# Patient Record
Sex: MALE | Race: WHITE | NOT HISPANIC OR LATINO | Employment: OTHER | URBAN - NONMETROPOLITAN AREA
[De-identification: names, ages, dates, MRNs, and addresses within clinical notes are randomized per-mention and may not be internally consistent; named-entity substitution may affect disease eponyms.]

---

## 2017-10-31 ENCOUNTER — OFFICE VISIT (OUTPATIENT)
Dept: UROLOGY | Facility: CLINIC | Age: 82
End: 2017-10-31

## 2017-10-31 VITALS — BODY MASS INDEX: 24.91 KG/M2 | HEIGHT: 70 IN | WEIGHT: 174 LBS

## 2017-10-31 DIAGNOSIS — R97.20 ELEVATED PSA: ICD-10-CM

## 2017-10-31 DIAGNOSIS — N40.1 BENIGN PROSTATIC HYPERPLASIA WITH WEAK URINARY STREAM: Primary | ICD-10-CM

## 2017-10-31 DIAGNOSIS — R39.12 BENIGN PROSTATIC HYPERPLASIA WITH WEAK URINARY STREAM: Primary | ICD-10-CM

## 2017-10-31 PROCEDURE — 99203 OFFICE O/P NEW LOW 30 MIN: CPT | Performed by: UROLOGY

## 2017-10-31 RX ORDER — FINASTERIDE 5 MG/1
5 TABLET, FILM COATED ORAL DAILY
Qty: 30 TABLET | Refills: 11 | Status: SHIPPED | OUTPATIENT
Start: 2017-10-31

## 2017-10-31 NOTE — PROGRESS NOTES
Chief Complaint:          Chief Complaint   Patient presents with   • Elevated PSA       HPI:   89 y.o. male.    HPI  Pt had lymphoma 26 years ago and he had chemotherapy.  Pt referred for a psa of 4.7.  Pt denies difficulty voiding.  Pt voids nocturia x 2    Past Medical History:        Past Medical History:   Diagnosis Date   • Asthma    • Cancer          Current Meds:     Current Outpatient Prescriptions   Medication Sig Dispense Refill   • PROAIR  (90 Base) MCG/ACT inhaler INHALE 2 PUFFS EVERY 4 HOURS AS NEEDED  5     No current facility-administered medications for this visit.         Allergies:      No Known Allergies     Past Surgical History:     Past Surgical History:   Procedure Laterality Date   • MASTOID SURGERY     • SHOULDER SURGERY           Social History:     Social History     Social History   • Marital status:      Spouse name: N/A   • Number of children: N/A   • Years of education: N/A     Occupational History   • Not on file.     Social History Main Topics   • Smoking status: Never Smoker   • Smokeless tobacco: Never Used   • Alcohol use No   • Drug use: No   • Sexual activity: Not on file     Other Topics Concern   • Not on file     Social History Narrative   • No narrative on file       Family History:     Family History   Problem Relation Age of Onset   • Prostate cancer Brother        Review of Systems:     Review of Systems    Physical Exam:     Physical Exam   Constitutional: He is oriented to person, place, and time.   HENT:   Head: Normocephalic and atraumatic.   Right Ear: External ear normal.   Left Ear: External ear normal.   Nose: Nose normal.   Mouth/Throat: Oropharynx is clear and moist.   Eyes: Conjunctivae and EOM are normal. Pupils are equal, round, and reactive to light.   Neck: Normal range of motion. Neck supple. No thyromegaly present.   Cardiovascular: Normal rate, regular rhythm, normal heart sounds and intact distal pulses.    No murmur  heard.  Pulmonary/Chest: Effort normal and breath sounds normal. No respiratory distress. He has no wheezes. He has no rales. He exhibits no tenderness.   Abdominal: Soft. Bowel sounds are normal. He exhibits no distension and no mass. There is no tenderness. No hernia.   Genitourinary: Rectum normal, prostate normal and penis normal.   Genitourinary Comments: Moderately enlarged without nodularity   Musculoskeletal: Normal range of motion. He exhibits no edema or tenderness.   Lymphadenopathy:     He has no cervical adenopathy.   Neurological: He is alert and oriented to person, place, and time. No cranial nerve deficit. He exhibits normal muscle tone. Coordination normal.   Skin: Skin is warm. No rash noted.   Psychiatric: He has a normal mood and affect. His behavior is normal. Judgment and thought content normal.   Nursing note and vitals reviewed.      Procedure:     No notes on file      Assessment:     Encounter Diagnoses   Name Primary?   • Benign prostatic hyperplasia with weak urinary stream Yes   • Elevated PSA      No orders of the defined types were placed in this encounter.      Plan:   I would start pt on finasteride and repeat his psa in 6 months    Counseling was given to patient and family for the following topics impressions. and the interim medical history and current results were reviewed.  A treatment plan with follow-up was made. Total time of the encounter was 35 minutes and 35 minutes were spent discussing Benign prostatic hyperplasia with weak urinary stream [N40.1, R39.12] face-to-face.        This document has been electronically signed by Jamin Marquis MD October 31, 2017 3:01 PM

## 2018-04-05 ENCOUNTER — OFFICE VISIT (OUTPATIENT)
Dept: SURGERY | Facility: CLINIC | Age: 83
End: 2018-04-05

## 2018-04-05 VITALS
HEART RATE: 75 BPM | HEIGHT: 65 IN | TEMPERATURE: 98.3 F | WEIGHT: 170 LBS | BODY MASS INDEX: 28.32 KG/M2 | DIASTOLIC BLOOD PRESSURE: 79 MMHG | RESPIRATION RATE: 17 BRPM | SYSTOLIC BLOOD PRESSURE: 154 MMHG

## 2018-04-05 DIAGNOSIS — L98.9 DISORDER OF SKIN OR SUBCUTANEOUS TISSUE: Primary | ICD-10-CM

## 2018-04-05 PROCEDURE — 99203 OFFICE O/P NEW LOW 30 MIN: CPT | Performed by: SURGERY

## 2018-04-05 RX ORDER — ASCORBIC ACID 500 MG
500 TABLET ORAL DAILY
COMMUNITY

## 2018-04-05 RX ORDER — UBIDECARENONE 75 MG
50 CAPSULE ORAL DAILY
COMMUNITY

## 2018-04-05 NOTE — PROGRESS NOTES
4/5/2018    Patient Information  Hal Zaragoza  [address] [place] [address]  3/18/1921  [phone] (home)     Chief Complaint   Patient presents with   • Skin Lesion     Skin Lesion Forehead/Suture removal L cheek       HPI  Patient's a 89-year-old white male referred by Dr. Pablo.  He has a lesion on his anterior left scalp which is ulcerating.  He also complains of a lesion on his right ear and a lesion posterior to the right ear.  He has a history of skin cancers.  These lesions are getting bigger and is quite concerned about them.  There is no radiation of pain.  There are no other modifying factors or associated symptoms.      Review of Systems:  The Past medical history, family history, social history, medication list, allergies, and Review of Systems has been reviewed and confirmed.    General: negative  Integumentary: negative  Eyes: glasses, eyes itch  ENT: hearing loss  Respiratory: shortness of breath, chronic cough and wheezing  Gastrointestinal: loss of appetite, heartburn and constipation  Cardiovascular: slow heart rate  Neurological: dizziness  Psychiatric: anxiety, depression, insomnia and mood swings  Hematologic/Lymphatic: easy bleeding and easy bruising  Genitourinary: incontinence  Musculoskeletal: painful joints, joint swelling and joint stiffness  Endocrine: negative  Breasts: negative      Patient Active Problem List   Diagnosis   • Cancer         Past Medical History:   Diagnosis Date   • Asthma    • Cancer    • Hiatal hernia    • Non Hodgkin's lymphoma    • Stroke          Past Surgical History:   Procedure Laterality Date   • EXTERNAL EAR SURGERY     • MASTOID SURGERY     • SHOULDER SURGERY     • SKIN CANCER EXCISION           Family History   Problem Relation Age of Onset   • Prostate cancer Brother    • Hypertension Brother    • Heart disease Brother    • Cancer Brother    • Cancer Mother    • Diabetes Mother    • Heart disease Sister    • Cancer Sister          Social History  "  Substance Use Topics   • Smoking status: Never Smoker   • Smokeless tobacco: Never Used   • Alcohol use No       Current Outpatient Prescriptions   Medication Sig Dispense Refill   • ascorbic acid (VITAMIN C) 500 MG tablet Take 500 mg by mouth Daily.     • vitamin B-12 (CYANOCOBALAMIN) 100 MCG tablet Take 50 mcg by mouth Daily.     • finasteride (PROSCAR) 5 MG tablet Take 1 tablet by mouth Daily. 30 tablet 11   • PROAIR  (90 Base) MCG/ACT inhaler INHALE 2 PUFFS EVERY 4 HOURS AS NEEDED  5     No current facility-administered medications for this visit.          Allergies  Review of patient's allergies indicates no known allergies.    /79   Pulse 75   Temp 98.3 °F (36.8 °C)   Resp 17   Ht 165.1 cm (65\")   Wt 77.1 kg (170 lb)   BMI 28.29 kg/m²      Physical Exam   Constitutional: He is oriented to person, place, and time. He appears well-developed and well-nourished. No distress.   HENT:   Head: Normocephalic.   Right Ear: External ear normal.   Left Ear: External ear normal.   Nose: Nose normal.   Mouth/Throat: Oropharynx is clear and moist.   Eyes: Conjunctivae and EOM are normal. Right eye exhibits no discharge. Left eye exhibits no discharge.   Neck: Normal range of motion. No JVD present. No tracheal deviation present. No thyromegaly present.   Cardiovascular: Normal rate, regular rhythm, normal heart sounds and intact distal pulses.  Exam reveals no gallop and no friction rub.    No murmur heard.  Pulmonary/Chest: Effort normal and breath sounds normal. No stridor. No respiratory distress. He has no wheezes. He has no rales. He exhibits no tenderness.   Abdominal: Soft. Bowel sounds are normal. He exhibits no distension and no mass. There is no tenderness. There is no rebound and no guarding. No hernia.   Genitourinary: Rectal exam shows guaiac negative stool.   Musculoskeletal: Normal range of motion. He exhibits no edema, tenderness or deformity.   Lymphadenopathy:     He has no cervical " adenopathy.   Neurological: He is alert and oriented to person, place, and time. He has normal reflexes. He displays normal reflexes. No cranial nerve deficit. He exhibits normal muscle tone. Coordination normal.   Skin: Skin is warm and dry. No rash noted. He is not diaphoretic. No erythema. No pallor.   Left anterior scalp there is a 2.5 cm all started lesion.  Right ear there is a 1 cm raised lesion.  Just posterior to the right ear there is a 3 cm area of crusty skin.  Patient also has sutures just left to his nose from a previous excision of a skin lesion.  The sutures have been removed   Psychiatric: He has a normal mood and affect. His behavior is normal. Judgment and thought content normal.                 ASSESSMENT  Multiple skin lesions suspicious for skin cancer        PLAN    Excision of scalp lesion, right ear lesion, and posterior right ear lesion in the operating room.  We'll also need to get records from his dermatologist.      informational weight control and smoking cessation given to patient    This document signed by Johnny Schulz MD April 5, 2018 12:23 PM

## 2018-04-10 ENCOUNTER — TELEPHONE (OUTPATIENT)
Dept: SURGERY | Facility: CLINIC | Age: 83
End: 2018-04-10

## 2018-04-10 NOTE — TELEPHONE ENCOUNTER
I called the patient to confirm the PAT. The patient's wife said they are aware that it is the 16th at 9:45

## 2018-04-16 ENCOUNTER — APPOINTMENT (OUTPATIENT)
Dept: PREADMISSION TESTING | Facility: HOSPITAL | Age: 83
End: 2018-04-16

## 2018-04-17 ENCOUNTER — APPOINTMENT (OUTPATIENT)
Dept: PREADMISSION TESTING | Facility: HOSPITAL | Age: 83
End: 2018-04-17

## 2018-04-17 LAB
ANION GAP SERPL CALCULATED.3IONS-SCNC: 6.8 MMOL/L (ref 3.6–11.2)
BUN BLD-MCNC: 20 MG/DL (ref 7–21)
BUN/CREAT SERPL: 16.9 (ref 7–25)
CALCIUM SPEC-SCNC: 9.5 MG/DL (ref 7.7–10)
CHLORIDE SERPL-SCNC: 105 MMOL/L (ref 99–112)
CO2 SERPL-SCNC: 28.2 MMOL/L (ref 24.3–31.9)
CREAT BLD-MCNC: 1.18 MG/DL (ref 0.43–1.29)
DEPRECATED RDW RBC AUTO: 43 FL (ref 37–54)
ERYTHROCYTE [DISTWIDTH] IN BLOOD BY AUTOMATED COUNT: 12.6 % (ref 11.5–14.5)
GFR SERPL CREATININE-BSD FRML MDRD: 57 ML/MIN/1.73
GLUCOSE BLD-MCNC: 90 MG/DL (ref 70–110)
HCT VFR BLD AUTO: 41.2 % (ref 42–52)
HGB BLD-MCNC: 13.7 G/DL (ref 14–18)
MCH RBC QN AUTO: 32.2 PG (ref 27–33)
MCHC RBC AUTO-ENTMCNC: 33.3 G/DL (ref 33–37)
MCV RBC AUTO: 96.9 FL (ref 80–94)
OSMOLALITY SERPL CALC.SUM OF ELEC: 281.5 MOSM/KG (ref 273–305)
PLATELET # BLD AUTO: 215 10*3/MM3 (ref 130–400)
PMV BLD AUTO: 9.7 FL (ref 6–10)
POTASSIUM BLD-SCNC: 4 MMOL/L (ref 3.5–5.3)
RBC # BLD AUTO: 4.25 10*6/MM3 (ref 4.7–6.1)
SODIUM BLD-SCNC: 140 MMOL/L (ref 135–153)
WBC NRBC COR # BLD: 7.74 10*3/MM3 (ref 4.5–12.5)

## 2018-04-17 PROCEDURE — 36415 COLL VENOUS BLD VENIPUNCTURE: CPT

## 2018-04-17 PROCEDURE — 80048 BASIC METABOLIC PNL TOTAL CA: CPT | Performed by: SURGERY

## 2018-04-17 PROCEDURE — 93010 ELECTROCARDIOGRAM REPORT: CPT | Performed by: INTERNAL MEDICINE

## 2018-04-17 PROCEDURE — 93005 ELECTROCARDIOGRAM TRACING: CPT

## 2018-04-17 PROCEDURE — 85027 COMPLETE CBC AUTOMATED: CPT | Performed by: SURGERY

## 2018-04-17 RX ORDER — DOCUSATE SODIUM 100 MG/1
100 CAPSULE, LIQUID FILLED ORAL DAILY
COMMUNITY

## 2018-04-17 NOTE — DISCHARGE INSTRUCTIONS
TAKE the following medications the morning of surgery:  All heart or blood pressure medications    Please discontinue all blood thinners and anticoagulants (except aspirin) prior to surgery as per your surgeon and cardiologist instructions.  Aspirin may be continued up to the day prior to surgery.    HOLD all diabetic medications the morning of surgery as order by physician.        Arrival time for surgery on 4/18/2018 is 0830 am.    General Instructions:  • Do NOT eat or drink after midnight 4/17/2018 which includes water, mints, or gum.  • You may brush your teeth. Dental appliances that are removable must be taken out day of surgery.  • Do NOT smoke, chew tobacco, or drink alcohol within 24 hours prior to surgery.  • Bring medications in original bottles, any inhalers and if applicable your C-PAP/BI-PAP machine  • Bring any papers given to you in the doctor’s office  • Wear clean, comfortable clothes and socks  • Do NOT wear contact lenses or make-up or dark nail polish.  Bring a case for your glasses if applicable.  • Bring crutches or walker if applicable  • Leave all other valuables and jewelry at home  • If you were given a blood bank armband, continue to wear it until discharged.    Preventing a Surgical Site Infection:  • Shower the night before surgery (unless instructed otherwise) using a fresh bar of anti-bacterial soap (such as Dial) and clean washcloth.  Dry with a clean towel and dress in clean clothing.  • For 2 to 3 days before surgery, avoid shaving with a razor near where you will have surgery because the razor can irritate skin and make it easier to develop an infection.  Ask your surgeon if you will be receiving antibiotics prior to surgery.  • Make sure you, your family, and all healthcare providers clean their hands with soap and water or an alcohol-based hand  before caring for you or your wound.  • If at all possible, quit smoking as many days before surgery as you can.    Day of  Surgery:  Upon arrival, a pre-op nurse and anesthesiologist will review your health history, obtain vital signs, and answer questions you may have.  The only belongings needed at this time will be your home medications and if applicable you C-PAP/BI-PAP machine.  If you are staying overnight, your family can leave the rest of your belongings in the car and bring them to your room later.  A pre-op nurse will start an IV and you may receive medication in preparation for surgery.  Due to patient privacy and limited space, only one member of your family will be able to accompany you in the pre-op area.  While you are in surgery your family should notify the waiting room  if they leave the waiting room area and provide a contact number.  Please be aware that surgery does come with discomfort.  We want to make every effort to control your discomfort so please discuss any uncontrolled symptoms with your nurse.  Your doctor will most likely have prescribed pain medications.  If you are going home after surgery you will receive individualized written care instructions before being discharged.  A responsible adult must drive you to and from the hospital on the day of surgery and stay with you for 24 hours.  If you are staying overnight following surgery, you will be transported to your hospital room following the recovery period.

## 2018-04-18 ENCOUNTER — ANESTHESIA (OUTPATIENT)
Dept: PERIOP | Facility: HOSPITAL | Age: 83
End: 2018-04-18

## 2018-04-18 ENCOUNTER — ANESTHESIA EVENT (OUTPATIENT)
Dept: PERIOP | Facility: HOSPITAL | Age: 83
End: 2018-04-18

## 2018-04-18 ENCOUNTER — HOSPITAL ENCOUNTER (OUTPATIENT)
Facility: HOSPITAL | Age: 83
Setting detail: HOSPITAL OUTPATIENT SURGERY
Discharge: HOME | End: 2018-04-18
Attending: SURGERY | Admitting: SURGERY

## 2018-04-18 VITALS
RESPIRATION RATE: 14 BRPM | DIASTOLIC BLOOD PRESSURE: 86 MMHG | BODY MASS INDEX: 28.32 KG/M2 | WEIGHT: 170 LBS | OXYGEN SATURATION: 98 % | TEMPERATURE: 98.6 F | SYSTOLIC BLOOD PRESSURE: 164 MMHG | HEART RATE: 80 BPM | HEIGHT: 65 IN

## 2018-04-18 DIAGNOSIS — L98.9 DISORDER OF SKIN OR SUBCUTANEOUS TISSUE: ICD-10-CM

## 2018-04-18 PROCEDURE — 25010000002 FENTANYL CITRATE (PF) 100 MCG/2ML SOLUTION: Performed by: NURSE ANESTHETIST, CERTIFIED REGISTERED

## 2018-04-18 PROCEDURE — 25010000002 PHENYLEPHRINE PER 1 ML: Performed by: NURSE ANESTHETIST, CERTIFIED REGISTERED

## 2018-04-18 PROCEDURE — 11422 EXC H-F-NK-SP B9+MARG 1.1-2: CPT | Performed by: SURGERY

## 2018-04-18 PROCEDURE — 25010000002 PROPOFOL 10 MG/ML EMULSION: Performed by: NURSE ANESTHETIST, CERTIFIED REGISTERED

## 2018-04-18 PROCEDURE — 88305 TISSUE EXAM BY PATHOLOGIST: CPT | Performed by: SURGERY

## 2018-04-18 PROCEDURE — 11642 EXC F/E/E/N/L MAL+MRG 1.1-2: CPT | Performed by: SURGERY

## 2018-04-18 PROCEDURE — 11622 EXC S/N/H/F/G MAL+MRG 1.1-2: CPT | Performed by: SURGERY

## 2018-04-18 PROCEDURE — 11641 EXC F/E/E/N/L MAL+MRG 0.6-1: CPT | Performed by: SURGERY

## 2018-04-18 RX ORDER — BUPIVACAINE HYDROCHLORIDE AND EPINEPHRINE 5; 5 MG/ML; UG/ML
INJECTION, SOLUTION PERINEURAL AS NEEDED
Status: DISCONTINUED | OUTPATIENT
Start: 2018-04-18 | End: 2018-04-18 | Stop reason: HOSPADM

## 2018-04-18 RX ORDER — FENTANYL CITRATE 50 UG/ML
50 INJECTION, SOLUTION INTRAMUSCULAR; INTRAVENOUS
Status: DISCONTINUED | OUTPATIENT
Start: 2018-04-18 | End: 2018-04-18 | Stop reason: HOSPADM

## 2018-04-18 RX ORDER — FENTANYL CITRATE 50 UG/ML
INJECTION, SOLUTION INTRAMUSCULAR; INTRAVENOUS AS NEEDED
Status: DISCONTINUED | OUTPATIENT
Start: 2018-04-18 | End: 2018-04-18 | Stop reason: SURG

## 2018-04-18 RX ORDER — BUPIVACAINE HYDROCHLORIDE 5 MG/ML
INJECTION, SOLUTION EPIDURAL; INTRACAUDAL AS NEEDED
Status: DISCONTINUED | OUTPATIENT
Start: 2018-04-18 | End: 2018-04-18 | Stop reason: HOSPADM

## 2018-04-18 RX ORDER — OXYCODONE HYDROCHLORIDE AND ACETAMINOPHEN 5; 325 MG/1; MG/1
1 TABLET ORAL ONCE AS NEEDED
Status: DISCONTINUED | OUTPATIENT
Start: 2018-04-18 | End: 2018-04-18 | Stop reason: HOSPADM

## 2018-04-18 RX ORDER — MIDAZOLAM HYDROCHLORIDE 1 MG/ML
1 INJECTION INTRAMUSCULAR; INTRAVENOUS
Status: DISCONTINUED | OUTPATIENT
Start: 2018-04-18 | End: 2018-04-18 | Stop reason: HOSPADM

## 2018-04-18 RX ORDER — MEPERIDINE HYDROCHLORIDE 50 MG/ML
12.5 INJECTION INTRAMUSCULAR; INTRAVENOUS; SUBCUTANEOUS
Status: DISCONTINUED | OUTPATIENT
Start: 2018-04-18 | End: 2018-04-18 | Stop reason: HOSPADM

## 2018-04-18 RX ORDER — SODIUM CHLORIDE, SODIUM LACTATE, POTASSIUM CHLORIDE, CALCIUM CHLORIDE 600; 310; 30; 20 MG/100ML; MG/100ML; MG/100ML; MG/100ML
125 INJECTION, SOLUTION INTRAVENOUS CONTINUOUS
Status: DISCONTINUED | OUTPATIENT
Start: 2018-04-18 | End: 2018-04-18 | Stop reason: HOSPADM

## 2018-04-18 RX ORDER — ONDANSETRON 2 MG/ML
4 INJECTION INTRAMUSCULAR; INTRAVENOUS ONCE AS NEEDED
Status: DISCONTINUED | OUTPATIENT
Start: 2018-04-18 | End: 2018-04-18 | Stop reason: HOSPADM

## 2018-04-18 RX ORDER — LIDOCAINE HYDROCHLORIDE 20 MG/ML
INJECTION, SOLUTION INFILTRATION; PERINEURAL AS NEEDED
Status: DISCONTINUED | OUTPATIENT
Start: 2018-04-18 | End: 2018-04-18 | Stop reason: SURG

## 2018-04-18 RX ORDER — IPRATROPIUM BROMIDE AND ALBUTEROL SULFATE 2.5; .5 MG/3ML; MG/3ML
3 SOLUTION RESPIRATORY (INHALATION) ONCE AS NEEDED
Status: DISCONTINUED | OUTPATIENT
Start: 2018-04-18 | End: 2018-04-18 | Stop reason: HOSPADM

## 2018-04-18 RX ORDER — MAGNESIUM HYDROXIDE 1200 MG/15ML
LIQUID ORAL AS NEEDED
Status: DISCONTINUED | OUTPATIENT
Start: 2018-04-18 | End: 2018-04-18 | Stop reason: HOSPADM

## 2018-04-18 RX ORDER — BACITRACIN, NEOMYCIN, POLYMYXIN B 400; 3.5; 5 [USP'U]/G; MG/G; [USP'U]/G
OINTMENT TOPICAL AS NEEDED
Status: DISCONTINUED | OUTPATIENT
Start: 2018-04-18 | End: 2018-04-18 | Stop reason: HOSPADM

## 2018-04-18 RX ORDER — MIDAZOLAM HYDROCHLORIDE 1 MG/ML
2 INJECTION INTRAMUSCULAR; INTRAVENOUS
Status: DISCONTINUED | OUTPATIENT
Start: 2018-04-18 | End: 2018-04-18 | Stop reason: HOSPADM

## 2018-04-18 RX ORDER — PROPOFOL 10 MG/ML
VIAL (ML) INTRAVENOUS AS NEEDED
Status: DISCONTINUED | OUTPATIENT
Start: 2018-04-18 | End: 2018-04-18 | Stop reason: SURG

## 2018-04-18 RX ORDER — SODIUM CHLORIDE 0.9 % (FLUSH) 0.9 %
1-10 SYRINGE (ML) INJECTION AS NEEDED
Status: DISCONTINUED | OUTPATIENT
Start: 2018-04-18 | End: 2018-04-18 | Stop reason: HOSPADM

## 2018-04-18 RX ADMIN — PROPOFOL 100 MG: 10 INJECTION, EMULSION INTRAVENOUS at 09:38

## 2018-04-18 RX ADMIN — LIDOCAINE HYDROCHLORIDE 60 MG: 20 INJECTION, SOLUTION INFILTRATION; PERINEURAL at 09:38

## 2018-04-18 RX ADMIN — SODIUM CHLORIDE, POTASSIUM CHLORIDE, SODIUM LACTATE AND CALCIUM CHLORIDE 125 ML/HR: 600; 310; 30; 20 INJECTION, SOLUTION INTRAVENOUS at 09:27

## 2018-04-18 RX ADMIN — FENTANYL CITRATE 100 MCG: 50 INJECTION INTRAMUSCULAR; INTRAVENOUS at 09:33

## 2018-04-18 RX ADMIN — PHENYLEPHRINE HYDROCHLORIDE 100 MCG: 10 INJECTION, SOLUTION INTRAMUSCULAR; INTRAVENOUS; SUBCUTANEOUS at 10:00

## 2018-04-18 RX ADMIN — PHENYLEPHRINE HYDROCHLORIDE 100 MCG: 10 INJECTION, SOLUTION INTRAMUSCULAR; INTRAVENOUS; SUBCUTANEOUS at 09:48

## 2018-04-18 NOTE — OP NOTE
Hal Zaragoza  4/18/2018      Operative Progress Note:    Surgeon and Assistant: Dr. Schulz    Pre-Operative Diagnosis: multiple skin lesions    Post-Operative Diagnosis: multiple skin lesions    Procedure(s):  excision left forehead lesion, excision right forehead lesion, excision posterior scalp lesion, excision left ear lesion, excision left posterior ear lesion.    Type of Anesthesia Administered: LMA and infection Marcaine 0.5% with epinephrine    Estimated Blood Loss: Minimal    Blood Products: None    Specimen Obtained/Removed: 1 cm left forehead lesion, 1 cm right forehead lesion, 1 cm posterior scalp lesion, 1 cm left ear lesion, 1 cm posterior left ear lesion.    Complication(s):  None    Graft/Implant/Prosthetics/Implanted Device/Transplants: None    Indication: patient's 89-year-old white male    Findings: as described on the specimens    Operative Report:  Patient was taken operating room and placed in the spine positional operating table.  LMA anesthesia was induced.  All lesions as described above under procedure and specimens were prepped and draped with Betadine.  All lesions were injected with Marcaine 0.5% with epinephrine.  All lesions were elliptically excised and closed with interrupted 4-0 nylon.  Sterile dressings were applied.  Procedure terminated.  Patient thought procedure very well returned recovery room satisfactory condition.    Patient was discharged home at recovery and we seen back in the office in 2 weeks.      Electronically Signed by: Johnny Schulz MD        Dictated Utilizing Dragon Dictation

## 2018-04-18 NOTE — ANESTHESIA POSTPROCEDURE EVALUATION
Patient: Hal Zaragoza    Procedure Summary     Date:  04/18/18 Room / Location:  [facility] OR 01 /  [facility] OR    Anesthesia Start:  0933 Anesthesia Stop:  1033    Procedure:  SKIN LESION EXCISION SCALP, RIGHT EAR, RIGHT NECK (Right ) Diagnosis:       Disorder of skin or subcutaneous tissue      (Disorder of skin or subcutaneous tissue [L98.9])    Surgeon:  Johnny Schulz MD Provider:  Jamin Miller MD    Anesthesia Type:  general ASA Status:  3          Anesthesia Type: general  Last vitals  BP   162/80 (04/18/18 1049)   Temp   98.8 °F (37.1 °C) (04/18/18 1034)   Pulse   72 (04/18/18 1049)   Resp   12 (04/18/18 1049)     SpO2   98 % (04/18/18 1049)     Post Anesthesia Care and Evaluation    Patient location during evaluation: PHASE II  Patient participation: complete - patient participated  Level of consciousness: awake and alert  Pain score: 1  Pain management: adequate  Airway patency: patent  Anesthetic complications: No anesthetic complications  PONV Status: controlled  Cardiovascular status: acceptable  Respiratory status: acceptable  Hydration status: acceptable

## 2018-04-18 NOTE — H&P (VIEW-ONLY)
4/5/2018    Patient Information  Hal Zaragoza  [address] [facility] [place] [address]  3/18/1921  [phone] (home)     Chief Complaint   Patient presents with   • Skin Lesion     Skin Lesion Forehead/Suture removal L cheek       HPI  Patient's a 89-year-old white male referred by Dr. Pablo.  He has a lesion on his anterior left scalp which is ulcerating.  He also complains of a lesion on his right ear and a lesion posterior to the right ear.  He has a history of skin cancers.  These lesions are getting bigger and is quite concerned about them.  There is no radiation of pain.  There are no other modifying factors or associated symptoms.      Review of Systems:  The Past medical history, family history, social history, medication list, allergies, and Review of Systems has been reviewed and confirmed.    General: negative  Integumentary: negative  Eyes: glasses, eyes itch  ENT: hearing loss  Respiratory: shortness of breath, chronic cough and wheezing  Gastrointestinal: loss of appetite, heartburn and constipation  Cardiovascular: slow heart rate  Neurological: dizziness  Psychiatric: anxiety, depression, insomnia and mood swings  Hematologic/Lymphatic: easy bleeding and easy bruising  Genitourinary: incontinence  Musculoskeletal: painful joints, joint swelling and joint stiffness  Endocrine: negative  Breasts: negative      Patient Active Problem List   Diagnosis   • Cancer         Past Medical History:   Diagnosis Date   • Asthma    • Cancer    • Hiatal hernia    • Non Hodgkin's lymphoma    • Stroke          Past Surgical History:   Procedure Laterality Date   • EXTERNAL EAR SURGERY     • MASTOID SURGERY     • SHOULDER SURGERY     • SKIN CANCER EXCISION           Family History   Problem Relation Age of Onset   • Prostate cancer Brother    • Hypertension Brother    • Heart disease Brother    • Cancer Brother    • Cancer Mother    • Diabetes Mother    • Heart disease Sister    • Cancer Sister          Social History  "  Substance Use Topics   • Smoking status: Never Smoker   • Smokeless tobacco: Never Used   • Alcohol use No       Current Outpatient Prescriptions   Medication Sig Dispense Refill   • ascorbic acid (VITAMIN C) 500 MG tablet Take 500 mg by mouth Daily.     • vitamin B-12 (CYANOCOBALAMIN) 100 MCG tablet Take 50 mcg by mouth Daily.     • finasteride (PROSCAR) 5 MG tablet Take 1 tablet by mouth Daily. 30 tablet 11   • PROAIR  (90 Base) MCG/ACT inhaler INHALE 2 PUFFS EVERY 4 HOURS AS NEEDED  5     No current facility-administered medications for this visit.          Allergies  Review of patient's allergies indicates no known allergies.    /79   Pulse 75   Temp 98.3 °F (36.8 °C)   Resp 17   Ht 165.1 cm (65\")   Wt 77.1 kg (170 lb)   BMI 28.29 kg/m²      Physical Exam   Constitutional: He is oriented to person, place, and time. He appears well-developed and well-nourished. No distress.   HENT:   Head: Normocephalic.   Right Ear: External ear normal.   Left Ear: External ear normal.   Nose: Nose normal.   Mouth/Throat: Oropharynx is clear and moist.   Eyes: Conjunctivae and EOM are normal. Right eye exhibits no discharge. Left eye exhibits no discharge.   Neck: Normal range of motion. No JVD present. No tracheal deviation present. No thyromegaly present.   Cardiovascular: Normal rate, regular rhythm, normal heart sounds and intact distal pulses.  Exam reveals no gallop and no friction rub.    No murmur heard.  Pulmonary/Chest: Effort normal and breath sounds normal. No stridor. No respiratory distress. He has no wheezes. He has no rales. He exhibits no tenderness.   Abdominal: Soft. Bowel sounds are normal. He exhibits no distension and no mass. There is no tenderness. There is no rebound and no guarding. No hernia.   Genitourinary: Rectal exam shows guaiac negative stool.   Musculoskeletal: Normal range of motion. He exhibits no edema, tenderness or deformity.   Lymphadenopathy:     He has no cervical " adenopathy.   Neurological: He is alert and oriented to person, place, and time. He has normal reflexes. He displays normal reflexes. No cranial nerve deficit. He exhibits normal muscle tone. Coordination normal.   Skin: Skin is warm and dry. No rash noted. He is not diaphoretic. No erythema. No pallor.   Left anterior scalp there is a 2.5 cm all started lesion.  Right ear there is a 1 cm raised lesion.  Just posterior to the right ear there is a 3 cm area of crusty skin.  Patient also has sutures just left to his nose from a previous excision of a skin lesion.  The sutures have been removed   Psychiatric: He has a normal mood and affect. His behavior is normal. Judgment and thought content normal.                 ASSESSMENT  Multiple skin lesions suspicious for skin cancer        PLAN    Excision of scalp lesion, right ear lesion, and posterior right ear lesion in the operating room.  We'll also need to get records from his dermatologist.      informational weight control and smoking cessation given to patient    This document signed by Johnny Schulz MD April 5, 2018 12:23 PM

## 2018-04-18 NOTE — ANESTHESIA PREPROCEDURE EVALUATION
Anesthesia Evaluation     no history of anesthetic complications:  NPO Solid Status: > 8 hours  NPO Liquid Status: > 8 hours           Airway   Mallampati: II  TM distance: >3 FB  Neck ROM: full  No difficulty expected  Dental    (+) edentulous    Pulmonary - normal exam   (+) asthma,   Cardiovascular - normal exam        Neuro/Psych  GI/Hepatic/Renal/Endo    (+)  hiatal hernia,      Musculoskeletal     Abdominal  - normal exam   Substance History      OB/GYN          Other      history of cancer    ROS/Med Hx Other: Lymphoma                    Anesthesia Plan    ASA 3     general     intravenous induction   Anesthetic plan and risks discussed with patient.

## 2018-04-19 LAB
LAB AP CASE REPORT: NORMAL
Lab: NORMAL
PATH REPORT.FINAL DX SPEC: NORMAL

## 2018-04-26 ENCOUNTER — OFFICE VISIT (OUTPATIENT)
Dept: SURGERY | Facility: CLINIC | Age: 83
End: 2018-04-26

## 2018-04-26 VITALS
BODY MASS INDEX: 28.32 KG/M2 | HEIGHT: 65 IN | RESPIRATION RATE: 18 BRPM | TEMPERATURE: 98.2 F | WEIGHT: 170 LBS | HEART RATE: 64 BPM | SYSTOLIC BLOOD PRESSURE: 138 MMHG | DIASTOLIC BLOOD PRESSURE: 80 MMHG

## 2018-04-26 DIAGNOSIS — C80.1 CANCER (HCC): Primary | ICD-10-CM

## 2018-04-26 PROCEDURE — 99024 POSTOP FOLLOW-UP VISIT: CPT | Performed by: SURGERY

## 2018-04-26 NOTE — PROGRESS NOTES
4/26/2018    Patient Information  Hal Zaragoza  [address] [facility] [address]  3/18/1921  [phone] (home)     Chief Complaint   Patient presents with   • Post-op Follow-up     Post Excision Multiple Skin Lesions       HPI  Patient's a 89-year-old white male status post excision of multiple multiple facial and scalp lesions and right ear lesion.  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  Patient Active Problem List   Diagnosis   • Cancer   • Disorder of skin or subcutaneous tissue         Past Medical History:   Diagnosis Date   • Asthma    • Cancer    • Hiatal hernia    • Non Hodgkin's lymphoma          Past Surgical History:   Procedure Laterality Date   • EXTERNAL EAR SURGERY     • FRACTURE SURGERY     • HEAD/NECK LESION/CYST EXCISION Right 4/18/2018    Procedure: SKIN LESION EXCISION SCALP, RIGHT EAR, RIGHT NECK;  Surgeon: Johnny Schulz MD;  Location: [facility];  Service: General   • MASTOID SURGERY     • SHOULDER SURGERY     • SKIN CANCER EXCISION           Family History   Problem Relation Age of Onset   • Prostate cancer Brother    • Hypertension Brother    • Heart disease Brother    • Cancer Brother    • Cancer Mother    • Diabetes Mother    • Heart disease Sister    • Cancer Sister          Social History   Substance Use Topics   • Smoking status: Former Smoker   • Smokeless tobacco: Never Used   • Alcohol use No       Current Outpatient Prescriptions   Medication Sig Dispense Refill   • ascorbic acid (VITAMIN C) 500 MG tablet Take 500 mg by mouth Daily.     • docusate sodium (COLACE) 100 MG capsule Take 100 mg by mouth Daily.     • finasteride (PROSCAR) 5 MG tablet Take 1 tablet by mouth Daily. 30 tablet 11   • PROAIR  (90 Base) MCG/ACT inhaler INHALE 2 PUFFS EVERY 4 HOURS AS NEEDED  5   • vitamin B-12 (CYANOCOBALAMIN) 100 MCG tablet Take 50 mcg by mouth Daily.       No current facility-administered medications for this  "visit.          Allergies  Review of patient's allergies indicates no known allergies.    /80   Pulse 64   Temp 98.2 °F (36.8 °C)   Resp 18   Ht 165 cm (64.96\")   Wt 77.1 kg (170 lb)   BMI 28.32 kg/m²      Physical Exam  Gen. 89-year-old white male no distress  Wounds look good  All sutures removed            ASSESSMENT  Multiple skin cancers status post excision somewhat positive margins but considering his age and overall skin condition observation appropriate        PLAN return when necessary          This document signed by Johnny Schulz MD April 26, 2018 3:04 PM   "

## 2018-07-03 ENCOUNTER — OFFICE VISIT (OUTPATIENT)
Dept: SURGERY | Facility: CLINIC | Age: 83
End: 2018-07-03

## 2018-07-03 VITALS
WEIGHT: 170 LBS | HEART RATE: 64 BPM | TEMPERATURE: 98.5 F | HEIGHT: 65 IN | BODY MASS INDEX: 28.32 KG/M2 | DIASTOLIC BLOOD PRESSURE: 80 MMHG | SYSTOLIC BLOOD PRESSURE: 135 MMHG | RESPIRATION RATE: 17 BRPM

## 2018-07-03 DIAGNOSIS — L98.9 DISORDER OF SKIN OR SUBCUTANEOUS TISSUE: Primary | ICD-10-CM

## 2018-07-03 DIAGNOSIS — C80.1 CANCER (HCC): ICD-10-CM

## 2018-07-03 PROCEDURE — 99213 OFFICE O/P EST LOW 20 MIN: CPT | Performed by: SURGERY

## 2018-07-03 NOTE — PROGRESS NOTES
7/3/2018    Patient Information  Hal Zaragoza  [address] [place] [address] [place] [address]  3/18/1921  [phone] (home)     Chief Complaint   Patient presents with   • Suspicious Skin Lesion     Multiple Facial Skin Lesions       HPI  Patient's a 89-year-old white male who has multiple skin lesions which are suspicious for carcinoma.  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  There is an obvious recurrence in this area.  He also has a lesion lateral aspect of his left thigh which is getting bigger concern Review of Systems:  The Past medical history, family history, social history, medication list, allergies, and Review of Systems has been reviewed and confirmed.    General: negative  Integumentary: negative  Eyes: glasses, eyes itch  ENT: hearing loss  Respiratory: shortness of breath, chronic cough and wheezing  Gastrointestinal: loss of appetite, heartburn and constipation  Cardiovascular: slow heart rate  Neurological: dizziness  Psychiatric: depression, insomnia and mood swings  Hematologic/Lymphatic: easy bleeding and easy bruising  Genitourinary: incontinence  Musculoskeletal: painful joints, joint swelling and joint stiffness  Endocrine: negative  Breasts: negative      Patient Active Problem List   Diagnosis   • Cancer (CMS/HCC)   • Disorder of skin or subcutaneous tissue         Past Medical History:   Diagnosis Date   • Asthma    • Cancer (CMS/HCC)    • Hiatal hernia    • Non Hodgkin's lymphoma (CMS/HCC)          Past Surgical History:   Procedure Laterality Date   • EXTERNAL EAR SURGERY     • FRACTURE SURGERY     • HEAD/NECK LESION/CYST EXCISION Right 4/18/2018    Procedure: SKIN LESION EXCISION SCALP, RIGHT EAR, RIGHT NECK;  Surgeon: Johnny Schulz MD;  [facility];  Service: General   • MASTOID SURGERY     • SHOULDER SURGERY     • SKIN CANCER EXCISION           Family History   Problem Relation Age of Onset   • Prostate cancer  "Brother    • Hypertension Brother    • Heart disease Brother    • Cancer Brother    • Cancer Mother    • Diabetes Mother    • Heart disease Sister    • Cancer Sister          Social History   Substance Use Topics   • Smoking status: Former Smoker   • Smokeless tobacco: Never Used   • Alcohol use No       Current Outpatient Prescriptions   Medication Sig Dispense Refill   • ascorbic acid (VITAMIN C) 500 MG tablet Take 500 mg by mouth Daily.     • docusate sodium (COLACE) 100 MG capsule Take 100 mg by mouth Daily.     • finasteride (PROSCAR) 5 MG tablet Take 1 tablet by mouth Daily. 30 tablet 11   • PROAIR  (90 Base) MCG/ACT inhaler INHALE 2 PUFFS EVERY 4 HOURS AS NEEDED  5   • vitamin B-12 (CYANOCOBALAMIN) 100 MCG tablet Take 50 mcg by mouth Daily.       No current facility-administered medications for this visit.          Allergies  Patient has no known allergies.    /80   Pulse 64   Temp 98.5 °F (36.9 °C)   Resp 17   Ht 165 cm (64.96\")   Wt 77.1 kg (170 lb)   BMI 28.32 kg/m²      Physical Exam   Constitutional: He is oriented to person, place, and time. He appears well-developed and well-nourished. No distress.   HENT:   Head: Normocephalic.   Right Ear: External ear normal.   Left Ear: External ear normal.   Nose: Nose normal.   Mouth/Throat: Oropharynx is clear and moist.   Eyes: Conjunctivae and EOM are normal. Right eye exhibits no discharge. Left eye exhibits no discharge.   Neck: Normal range of motion. No JVD present. No tracheal deviation present. No thyromegaly present.   Cardiovascular: Normal rate, regular rhythm, normal heart sounds and intact distal pulses.  Exam reveals no gallop and no friction rub.    No murmur heard.  Pulmonary/Chest: Effort normal and breath sounds normal. No stridor. No respiratory distress. He has no wheezes. He has no rales. He exhibits no tenderness.   Abdominal: Soft. Bowel sounds are normal. He exhibits no distension and no mass. There is no tenderness. " There is no rebound and no guarding. No hernia.   Genitourinary: Rectal exam shows guaiac negative stool.   Musculoskeletal: Normal range of motion. He exhibits no edema, tenderness or deformity.   Lymphadenopathy:     He has no cervical adenopathy.   Neurological: He is alert and oriented to person, place, and time. He has normal reflexes. He displays normal reflexes. No cranial nerve deficit. He exhibits normal muscle tone. Coordination normal.   Skin: Skin is warm and dry. No rash noted. He is not diaphoretic. No erythema. No pallor.   Posterior right ear there is a dark 1 cm lesion.  Lateral aspect periorbitally area left thigh there is a scaly lesion 1 cm.   Psychiatric: He has a normal mood and affect. His behavior is normal. Judgment and thought content normal.                 ASSESSMENT  Probable skin cancer        PLAN    Incision under local in the operating room  I    Patient's Body mass index is 28.32 kg/m². BMI is above normal parameters. Recommendations include: educational material.           This document signed by Johnny Schulz MD July 3, 2018 11:10 AM

## 2018-07-09 ENCOUNTER — TELEPHONE (OUTPATIENT)
Dept: SURGERY | Facility: CLINIC | Age: 83
End: 2018-07-09

## 2018-07-12 ENCOUNTER — APPOINTMENT (OUTPATIENT)
Dept: PREADMISSION TESTING | Facility: HOSPITAL | Age: 83
End: 2018-07-12

## 2018-07-12 LAB
ANION GAP SERPL CALCULATED.3IONS-SCNC: 5.3 MMOL/L (ref 3.6–11.2)
BUN BLD-MCNC: 22 MG/DL (ref 7–21)
BUN/CREAT SERPL: 16.2 (ref 7–25)
CALCIUM SPEC-SCNC: 8.8 MG/DL (ref 7.7–10)
CHLORIDE SERPL-SCNC: 108 MMOL/L (ref 99–112)
CO2 SERPL-SCNC: 28.7 MMOL/L (ref 24.3–31.9)
CREAT BLD-MCNC: 1.36 MG/DL (ref 0.43–1.29)
DEPRECATED RDW RBC AUTO: 44.6 FL (ref 37–54)
ERYTHROCYTE [DISTWIDTH] IN BLOOD BY AUTOMATED COUNT: 12.7 % (ref 11.5–14.5)
GFR SERPL CREATININE-BSD FRML MDRD: 48 ML/MIN/1.73
GLUCOSE BLD-MCNC: 102 MG/DL (ref 70–110)
HCT VFR BLD AUTO: 39.2 % (ref 42–52)
HGB BLD-MCNC: 12.9 G/DL (ref 14–18)
MCH RBC QN AUTO: 32.5 PG (ref 27–33)
MCHC RBC AUTO-ENTMCNC: 32.9 G/DL (ref 33–37)
MCV RBC AUTO: 98.7 FL (ref 80–94)
OSMOLALITY SERPL CALC.SUM OF ELEC: 286.6 MOSM/KG (ref 273–305)
PLATELET # BLD AUTO: 214 10*3/MM3 (ref 130–400)
PMV BLD AUTO: 9.7 FL (ref 6–10)
POTASSIUM BLD-SCNC: 4.5 MMOL/L (ref 3.5–5.3)
RBC # BLD AUTO: 3.97 10*6/MM3 (ref 4.7–6.1)
SODIUM BLD-SCNC: 142 MMOL/L (ref 135–153)
WBC NRBC COR # BLD: 7.83 10*3/MM3 (ref 4.5–12.5)

## 2018-07-12 PROCEDURE — 36415 COLL VENOUS BLD VENIPUNCTURE: CPT

## 2018-07-12 PROCEDURE — 80048 BASIC METABOLIC PNL TOTAL CA: CPT | Performed by: ANESTHESIOLOGY

## 2018-07-12 PROCEDURE — 85027 COMPLETE CBC AUTOMATED: CPT | Performed by: ANESTHESIOLOGY

## 2018-07-12 NOTE — DISCHARGE INSTRUCTIONS
7/16/2018   TIME GIVEN PER DR LACY OFFICE    TAKE the following medications the morning of surgery:  All heart or blood pressure medications    HOLD all diabetic medications the morning of surgery as ordered by physician.    Please discontinue all blood thinners and anticoagulants (except aspirin) prior to surgery as per your surgeon and cardiologist instructions.  Aspirin may be continued up to the day prior to surgery.     CHLORHEXIDINE CLOTHS GIVEN WITH INSTRUCTIONS AND FORM TO RETURN TO HOSPITAL    General Instructions:  · Do not eat or drink after midnight: includes water, mints, or gum. You may brush your teeth.  Dental appliances that are removable must be taken out day of surgery.  · Do not smoke, chew tobacco, or drink alcohol.  · Bring medications in original bottles, any inhalers and if applicable your C-PAP/BI-PAP machine.  · Bring any papers given to you in the doctor's office.  · Wear clean comfortable clothes and socks.  · Do not wear contact lenses or make-up. Bring a case for your glasses if applicable.  · Bring crutches or walker if applicable.  · Leave all other valuables and jewelry at home.    If you were given a blood bank ID arm band remember to bring it with you the day of surgery.    Preventing a Surgical Site Infection:  Shower the night before surgery (unless instructed other wise) using a fresh bar of anti-bacterial soap (such as Dial) and clean washcloth. Dry with a clean towel and dress in clean clothing.  For 2 to 3 days before surgery, avoid shaving with a razor near where you will have surgery because the razor can irritate skin and make it easier to develop an infection. Ask your surgeon if you will be receiving antibiotics prior to surgery.  Make sure you, your family, and all healthcare providers clear their hands with soap and water or an alcohol-based hand  before caring for you or your wound.  If at all possible, quit smoking as many days before surgery as you  can.    Day of surgery:  Upon arrival, a Pre-op nurse and Anesthesiologist will review your health history, obtain vital signs, and answer questions you may have. The only belongings needed at this time will be your home medications and if applicable your C-PAP/BI-PAP machine. If you are staying overnight your family can leave the rest of your belongings in the car and bring them to your room later. A Pre-op nurse will start an IV and you may receive medication in preparation for surgery, including something to help you relax. Your family will be able to see you in the Pre-op area. While you are in surgery your family should notify the waiting room  if they leave the waiting room area and provide a contact phone number.    Please be aware that surgery does come with discomfort. We want to make every effort to control your discomfort so please discuss any uncontrolled symptoms with your nurse. Your doctor will most likely have prescribed pain medications.    If you are going home after surgery you will receive individualized written care instructions before being discharged. A responsible adult must drive you to and from the hospital on the day of surgery and stay with you for 24 hours.    If you are staying overnight following surgery, you will be transported to your hospital room following the recovery period.  [facility] has all private rooms.    If you have any questions please call Pre-Admission Testing at [phone].  Deductibles and co-payments are collected on the day of service. Please be prepared to pay the required co-pay, deductible or deposit on the day of service as defined by your plan.

## 2018-07-16 ENCOUNTER — HOSPITAL ENCOUNTER (OUTPATIENT)
Facility: HOSPITAL | Age: 83
Setting detail: HOSPITAL OUTPATIENT SURGERY
Discharge: HOME | End: 2018-07-16
Attending: SURGERY | Admitting: SURGERY

## 2018-07-16 ENCOUNTER — ANESTHESIA (OUTPATIENT)
Dept: PERIOP | Facility: HOSPITAL | Age: 83
End: 2018-07-16

## 2018-07-16 ENCOUNTER — ANESTHESIA EVENT (OUTPATIENT)
Dept: PERIOP | Facility: HOSPITAL | Age: 83
End: 2018-07-16

## 2018-07-16 VITALS
SYSTOLIC BLOOD PRESSURE: 172 MMHG | BODY MASS INDEX: 25.18 KG/M2 | HEIGHT: 69 IN | TEMPERATURE: 97.8 F | HEART RATE: 75 BPM | OXYGEN SATURATION: 98 % | RESPIRATION RATE: 16 BRPM | DIASTOLIC BLOOD PRESSURE: 91 MMHG | WEIGHT: 170 LBS

## 2018-07-16 DIAGNOSIS — L98.9 DISORDER OF SKIN OR SUBCUTANEOUS TISSUE: ICD-10-CM

## 2018-07-16 DIAGNOSIS — C80.1 CANCER (HCC): ICD-10-CM

## 2018-07-16 PROCEDURE — 11442 EXC FACE-MM B9+MARG 1.1-2 CM: CPT | Performed by: SURGERY

## 2018-07-16 PROCEDURE — 11423 EXC H-F-NK-SP B9+MARG 2.1-3: CPT | Performed by: SURGERY

## 2018-07-16 PROCEDURE — 25010000002 PROPOFOL 10 MG/ML EMULSION: Performed by: NURSE ANESTHETIST, CERTIFIED REGISTERED

## 2018-07-16 PROCEDURE — 11422 EXC H-F-NK-SP B9+MARG 1.1-2: CPT | Performed by: SURGERY

## 2018-07-16 PROCEDURE — 25010000002 ONDANSETRON PER 1 MG: Performed by: NURSE ANESTHETIST, CERTIFIED REGISTERED

## 2018-07-16 PROCEDURE — 25010000002 FENTANYL CITRATE (PF) 100 MCG/2ML SOLUTION: Performed by: NURSE ANESTHETIST, CERTIFIED REGISTERED

## 2018-07-16 PROCEDURE — 88305 TISSUE EXAM BY PATHOLOGIST: CPT | Performed by: SURGERY

## 2018-07-16 PROCEDURE — 11644 EXC F/E/E/N/L MAL+MRG 3.1-4: CPT | Performed by: SURGERY

## 2018-07-16 RX ORDER — SODIUM CHLORIDE 0.9 % (FLUSH) 0.9 %
1-10 SYRINGE (ML) INJECTION AS NEEDED
Status: DISCONTINUED | OUTPATIENT
Start: 2018-07-16 | End: 2018-07-16 | Stop reason: HOSPADM

## 2018-07-16 RX ORDER — OXYCODONE HYDROCHLORIDE AND ACETAMINOPHEN 5; 325 MG/1; MG/1
1 TABLET ORAL ONCE AS NEEDED
Status: DISCONTINUED | OUTPATIENT
Start: 2018-07-16 | End: 2018-07-16 | Stop reason: HOSPADM

## 2018-07-16 RX ORDER — ONDANSETRON 2 MG/ML
INJECTION INTRAMUSCULAR; INTRAVENOUS AS NEEDED
Status: DISCONTINUED | OUTPATIENT
Start: 2018-07-16 | End: 2018-07-16 | Stop reason: SURG

## 2018-07-16 RX ORDER — BUPIVACAINE HYDROCHLORIDE AND EPINEPHRINE 5; 5 MG/ML; UG/ML
INJECTION, SOLUTION EPIDURAL; INTRACAUDAL; PERINEURAL AS NEEDED
Status: DISCONTINUED | OUTPATIENT
Start: 2018-07-16 | End: 2018-07-16 | Stop reason: HOSPADM

## 2018-07-16 RX ORDER — ONDANSETRON 2 MG/ML
4 INJECTION INTRAMUSCULAR; INTRAVENOUS ONCE AS NEEDED
Status: DISCONTINUED | OUTPATIENT
Start: 2018-07-16 | End: 2018-07-16 | Stop reason: HOSPADM

## 2018-07-16 RX ORDER — IPRATROPIUM BROMIDE AND ALBUTEROL SULFATE 2.5; .5 MG/3ML; MG/3ML
3 SOLUTION RESPIRATORY (INHALATION) ONCE AS NEEDED
Status: DISCONTINUED | OUTPATIENT
Start: 2018-07-16 | End: 2018-07-16 | Stop reason: HOSPADM

## 2018-07-16 RX ORDER — MAGNESIUM HYDROXIDE 1200 MG/15ML
LIQUID ORAL AS NEEDED
Status: DISCONTINUED | OUTPATIENT
Start: 2018-07-16 | End: 2018-07-16 | Stop reason: HOSPADM

## 2018-07-16 RX ORDER — SODIUM CHLORIDE, SODIUM LACTATE, POTASSIUM CHLORIDE, CALCIUM CHLORIDE 600; 310; 30; 20 MG/100ML; MG/100ML; MG/100ML; MG/100ML
125 INJECTION, SOLUTION INTRAVENOUS CONTINUOUS
Status: DISCONTINUED | OUTPATIENT
Start: 2018-07-16 | End: 2018-07-16 | Stop reason: HOSPADM

## 2018-07-16 RX ORDER — PROPOFOL 10 MG/ML
VIAL (ML) INTRAVENOUS AS NEEDED
Status: DISCONTINUED | OUTPATIENT
Start: 2018-07-16 | End: 2018-07-16 | Stop reason: SURG

## 2018-07-16 RX ORDER — FAMOTIDINE 10 MG/ML
INJECTION, SOLUTION INTRAVENOUS AS NEEDED
Status: DISCONTINUED | OUTPATIENT
Start: 2018-07-16 | End: 2018-07-16 | Stop reason: SURG

## 2018-07-16 RX ORDER — BACITRACIN, NEOMYCIN, POLYMYXIN B 400; 3.5; 5 [USP'U]/G; MG/G; [USP'U]/G
OINTMENT TOPICAL AS NEEDED
Status: DISCONTINUED | OUTPATIENT
Start: 2018-07-16 | End: 2018-07-16 | Stop reason: HOSPADM

## 2018-07-16 RX ORDER — FENTANYL CITRATE 50 UG/ML
INJECTION, SOLUTION INTRAMUSCULAR; INTRAVENOUS AS NEEDED
Status: DISCONTINUED | OUTPATIENT
Start: 2018-07-16 | End: 2018-07-16 | Stop reason: SURG

## 2018-07-16 RX ORDER — FENTANYL CITRATE 50 UG/ML
50 INJECTION, SOLUTION INTRAMUSCULAR; INTRAVENOUS
Status: DISCONTINUED | OUTPATIENT
Start: 2018-07-16 | End: 2018-07-16 | Stop reason: HOSPADM

## 2018-07-16 RX ORDER — MEPERIDINE HYDROCHLORIDE 50 MG/ML
12.5 INJECTION INTRAMUSCULAR; INTRAVENOUS; SUBCUTANEOUS
Status: DISCONTINUED | OUTPATIENT
Start: 2018-07-16 | End: 2018-07-16 | Stop reason: HOSPADM

## 2018-07-16 RX ADMIN — PROPOFOL 60 MG: 10 INJECTION, EMULSION INTRAVENOUS at 07:44

## 2018-07-16 RX ADMIN — ONDANSETRON 4 MG: 2 INJECTION, SOLUTION INTRAMUSCULAR; INTRAVENOUS at 07:44

## 2018-07-16 RX ADMIN — SODIUM CHLORIDE, POTASSIUM CHLORIDE, SODIUM LACTATE AND CALCIUM CHLORIDE: 600; 310; 30; 20 INJECTION, SOLUTION INTRAVENOUS at 07:35

## 2018-07-16 RX ADMIN — FAMOTIDINE 20 MG: 10 INJECTION, SOLUTION INTRAVENOUS at 07:44

## 2018-07-16 RX ADMIN — FENTANYL CITRATE 50 MCG: 50 INJECTION INTRAMUSCULAR; INTRAVENOUS at 07:51

## 2018-07-16 RX ADMIN — SODIUM CHLORIDE, POTASSIUM CHLORIDE, SODIUM LACTATE AND CALCIUM CHLORIDE 125 ML/HR: 600; 310; 30; 20 INJECTION, SOLUTION INTRAVENOUS at 07:32

## 2018-07-16 RX ADMIN — FENTANYL CITRATE 50 MCG: 50 INJECTION INTRAMUSCULAR; INTRAVENOUS at 08:00

## 2018-07-16 NOTE — H&P (VIEW-ONLY)
7/3/2018    Patient Information  Hal Zaragoza  [address] [place] [address] [place] [address]  3/18/1921  [phone] (home)     Chief Complaint   Patient presents with   • Suspicious Skin Lesion     Multiple Facial Skin Lesions       HPI  Patient's a 89-year-old white male who has multiple skin lesions which are suspicious for carcinoma.  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  There is an obvious recurrence in this area.  He also has a lesion lateral aspect of his left thigh which is getting bigger concern Review of Systems:  The Past medical history, family history, social history, medication list, allergies, and Review of Systems has been reviewed and confirmed.    General: negative  Integumentary: negative  Eyes: glasses, eyes itch  ENT: hearing loss  Respiratory: shortness of breath, chronic cough and wheezing  Gastrointestinal: loss of appetite, heartburn and constipation  Cardiovascular: slow heart rate  Neurological: dizziness  Psychiatric: depression, insomnia and mood swings  Hematologic/Lymphatic: easy bleeding and easy bruising  Genitourinary: incontinence  Musculoskeletal: painful joints, joint swelling and joint stiffness  Endocrine: negative  Breasts: negative      Patient Active Problem List   Diagnosis   • Cancer (CMS/HCC)   • Disorder of skin or subcutaneous tissue         Past Medical History:   Diagnosis Date   • Asthma    • Cancer (CMS/HCC)    • Hiatal hernia    • Non Hodgkin's lymphoma (CMS/HCC)          Past Surgical History:   Procedure Laterality Date   • EXTERNAL EAR SURGERY     • FRACTURE SURGERY     • HEAD/NECK LESION/CYST EXCISION Right 4/18/2018    Procedure: SKIN LESION EXCISION SCALP, RIGHT EAR, RIGHT NECK;  Surgeon: Johnny Schulz MD;  Location: [facility];  Service: General   • MASTOID SURGERY     • SHOULDER SURGERY     • SKIN CANCER EXCISION           Family History   Problem Relation Age of Onset   • Prostate cancer  "Brother    • Hypertension Brother    • Heart disease Brother    • Cancer Brother    • Cancer Mother    • Diabetes Mother    • Heart disease Sister    • Cancer Sister          Social History   Substance Use Topics   • Smoking status: Former Smoker   • Smokeless tobacco: Never Used   • Alcohol use No       Current Outpatient Prescriptions   Medication Sig Dispense Refill   • ascorbic acid (VITAMIN C) 500 MG tablet Take 500 mg by mouth Daily.     • docusate sodium (COLACE) 100 MG capsule Take 100 mg by mouth Daily.     • finasteride (PROSCAR) 5 MG tablet Take 1 tablet by mouth Daily. 30 tablet 11   • PROAIR  (90 Base) MCG/ACT inhaler INHALE 2 PUFFS EVERY 4 HOURS AS NEEDED  5   • vitamin B-12 (CYANOCOBALAMIN) 100 MCG tablet Take 50 mcg by mouth Daily.       No current facility-administered medications for this visit.          Allergies  Patient has no known allergies.    /80   Pulse 64   Temp 98.5 °F (36.9 °C)   Resp 17   Ht 165 cm (64.96\")   Wt 77.1 kg (170 lb)   BMI 28.32 kg/m²      Physical Exam   Constitutional: He is oriented to person, place, and time. He appears well-developed and well-nourished. No distress.   HENT:   Head: Normocephalic.   Right Ear: External ear normal.   Left Ear: External ear normal.   Nose: Nose normal.   Mouth/Throat: Oropharynx is clear and moist.   Eyes: Conjunctivae and EOM are normal. Right eye exhibits no discharge. Left eye exhibits no discharge.   Neck: Normal range of motion. No JVD present. No tracheal deviation present. No thyromegaly present.   Cardiovascular: Normal rate, regular rhythm, normal heart sounds and intact distal pulses.  Exam reveals no gallop and no friction rub.    No murmur heard.  Pulmonary/Chest: Effort normal and breath sounds normal. No stridor. No respiratory distress. He has no wheezes. He has no rales. He exhibits no tenderness.   Abdominal: Soft. Bowel sounds are normal. He exhibits no distension and no mass. There is no tenderness. " There is no rebound and no guarding. No hernia.   Genitourinary: Rectal exam shows guaiac negative stool.   Musculoskeletal: Normal range of motion. He exhibits no edema, tenderness or deformity.   Lymphadenopathy:     He has no cervical adenopathy.   Neurological: He is alert and oriented to person, place, and time. He has normal reflexes. He displays normal reflexes. No cranial nerve deficit. He exhibits normal muscle tone. Coordination normal.   Skin: Skin is warm and dry. No rash noted. He is not diaphoretic. No erythema. No pallor.   Posterior right ear there is a dark 1 cm lesion.  Lateral aspect periorbitally area left thigh there is a scaly lesion 1 cm.   Psychiatric: He has a normal mood and affect. His behavior is normal. Judgment and thought content normal.                 ASSESSMENT  Probable skin cancer        PLAN    Incision under local in the operating room  I    Patient's Body mass index is 28.32 kg/m². BMI is above normal parameters. Recommendations include: educational material.           This document signed by Johnny Schulz MD July 3, 2018 11:10 AM

## 2018-07-16 NOTE — ANESTHESIA PROCEDURE NOTES
Airway  Urgency: elective    Date/Time: 7/16/2018 7:52 AM  Airway not difficult    General Information and Staff    Patient location during procedure: OR  Anesthesiologist: CHRISTINA HODGE  CRNA: TRACIE JERONIMO    Indications and Patient Condition  Indications for airway management: airway protection    Preoxygenated: yes  Mask difficulty assessment: 0 - not attempted    Final Airway Details  Final airway type: supraglottic airway      Successful airway: unique  Size 4    Number of attempts at approach: 1    Additional Comments  No trauma or change to dentition

## 2018-07-16 NOTE — ANESTHESIA POSTPROCEDURE EVALUATION
Patient: Hal Zaragoza    Procedure Summary     Date:  07/16/18 Room / Location:  [facility] OR 01 /  [facility] OR    Anesthesia Start:  0740 Anesthesia Stop:  0825    Procedure:  Excision Multiple facial and neck lesions (N/A ) Diagnosis:       Cancer (CMS/HCC)      Disorder of skin or subcutaneous tissue      (Cancer (CMS/HCC) [C80.1])      (Disorder of skin or subcutaneous tissue [L98.9])    Surgeon:  Johnny Schulz MD Provider:  Jamin Loya DO    Anesthesia Type:  general, MAC ASA Status:  3          Anesthesia Type: general, MAC  Last vitals  BP   172/91 (07/16/18 0921)   Temp   97.8 °F (36.6 °C) (07/16/18 0921)   Pulse   75 (07/16/18 0921)   Resp   16 (07/16/18 0921)     SpO2   98 % (07/16/18 0921)     Post Anesthesia Care and Evaluation    Patient location during evaluation: PHASE II  Patient participation: complete - patient participated  Level of consciousness: awake and alert  Pain score: 0  Pain management: adequate  Airway patency: patent  Anesthetic complications: No anesthetic complications  PONV Status: controlled  Cardiovascular status: acceptable  Respiratory status: acceptable and room air  Hydration status: acceptable  No anesthesia care post op

## 2018-07-16 NOTE — ANESTHESIA PREPROCEDURE EVALUATION
Anesthesia Evaluation     Patient summary reviewed and Nursing notes reviewed   no history of anesthetic complications:  NPO Solid Status: > 8 hours  NPO Liquid Status: > 8 hours           Airway   Mallampati: II  TM distance: >3 FB  Neck ROM: limited  No difficulty expected  Dental - normal exam   (+) lower dentures and upper dentures    Pulmonary - normal exam    breath sounds clear to auscultation  (+) a smoker Former, asthma, shortness of breath,   Cardiovascular - normal exam  Exercise tolerance: poor (<4 METS)    ECG reviewed  Rhythm: regular  Rate: normal    (+) CORTÉS,       Neuro/Psych  (+) neuromuscular disease (h/o vertigo), TIA, dizziness/light headedness, weakness,     GI/Hepatic/Renal/Endo    (+)  hiatal hernia, GERD well controlled,      Musculoskeletal     (+) gait problem,   Abdominal  - normal exam    Bowel sounds: normal.   Substance History - negative use     OB/GYN negative ob/gyn ROS         Other      history of cancer (non-hodgkins lymphoma) remission                  Anesthesia Plan    ASA 3     general and MAC   total IV anesthesia(MAC vs. GA...the patient is ok with local mac but would prefer to be unconscious.  )  intravenous induction   Anesthetic plan and risks discussed with patient.    Plan discussed with CRNA.

## 2018-07-16 NOTE — OP NOTE
Hal Rodriguezdoreen  7/16/2018      Operative Progress Note:    Surgeon and Assistant: Dr. Schulz    Pre-Operative Diagnosis: suspicious skin lesion right posterior ear, left temple, left scalp, and left posterior ear    Post-Operative Diagnosis: suspicious skin lesion right posterior ear 2 cm, left temple 1 cm, left scalp 0.5 cm, and left posterior ear 1 cm    Procedure(s):  excision of foreskin lesions as listed above    Type of Anesthesia Administered: LMA anesthesia with infiltration of Marcaine 0.5% with epinephrine    Estimated Blood Loss: Minimal    Blood Products: None    Specimen Obtained/Removed: right posterior ear skin lesion, left temporal skin lesion, left scalp skin lesion, and left posterior ear skin lesion all measuring 1 mg.    Complication(s):  None    Graft/Implant/Prosthetics/Implanted Device/Transplants: None    Indication: patient's a 89-year-old white male with multiple skin lesions and history of skin cancers    Findings: as indicated above under postoperative diagnosis    Operative Report:  Patient was taken operating room and laid no spine position on stretcher.  LMA anesthesia was induced.  His face neck and scalp were prepped draped usual sterile fashion.  Marcaine 0.5% was injected in each of the lesions as indicated above.  Elliptical incisions were made.  INCISIONS were closed with interrupted 3-0 nylon.  Neosporin was applied.  The procedure terminated.  Patient thought procedure very well and returned recovery room satisfactory.    Patient be discharged home at recovery will be seen back in the office in one week.       Electronically Signed by: Johnny Schulz MD        Dictated Utilizing U-NOTE

## 2018-07-18 LAB
LAB AP CASE REPORT: NORMAL
PATH REPORT.FINAL DX SPEC: NORMAL

## 2018-07-24 ENCOUNTER — OFFICE VISIT (OUTPATIENT)
Dept: SURGERY | Facility: CLINIC | Age: 83
End: 2018-07-24

## 2018-07-24 VITALS
OXYGEN SATURATION: 94 % | TEMPERATURE: 97.1 F | HEIGHT: 69 IN | HEART RATE: 68 BPM | BODY MASS INDEX: 25.18 KG/M2 | DIASTOLIC BLOOD PRESSURE: 67 MMHG | SYSTOLIC BLOOD PRESSURE: 136 MMHG | RESPIRATION RATE: 20 BRPM | WEIGHT: 170 LBS

## 2018-07-24 DIAGNOSIS — C80.1 CANCER (HCC): Primary | ICD-10-CM

## 2018-07-24 PROCEDURE — 99024 POSTOP FOLLOW-UP VISIT: CPT | Performed by: SURGERY

## 2018-07-24 NOTE — PROGRESS NOTES
7/24/2018    Patient Information  Hal Zaragoza  [address] [place] [address]  3/18/1921  [phone] (home)     Chief Complaint   Patient presents with   • Post-op     POST SKIN CANCER EXCISION       HPI  Patient's eye 7-year-old white male here for removal of sutures or skin lesion removal.  All were benign except 1 behind the right ear habits evidence of carcinoma in situ    Review of Systems      Patient Active Problem List   Diagnosis   • Cancer (CMS/HCC)   • Disorder of skin or subcutaneous tissue         Past Medical History:   Diagnosis Date   • Asthma    • Cancer (CMS/HCC)    • GERD (gastroesophageal reflux disease)    • Hiatal hernia    • Non Hodgkin's lymphoma (CMS/HCC)    • Skin lesions    • Vertigo     OCASSIONALLY         Past Surgical History:   Procedure Laterality Date   • EXTERNAL EAR SURGERY     • EYE SURGERY     • FRACTURE SURGERY     • HEAD/NECK LESION/CYST EXCISION Right 4/18/2018    Procedure: SKIN LESION EXCISION SCALP, RIGHT EAR, RIGHT NECK;  Surgeon: Johnny Schulz MD;  Location: [facility] OR;  Service: General   • HEAD/NECK LESION/CYST EXCISION N/A 7/16/2018    Procedure: Excision Multiple facial and neck lesions;  Surgeon: Johnny Schulz MD;  Location: [facility];  Service: General   • MASTOID SURGERY     • SHOULDER SURGERY     • SKIN CANCER EXCISION           Family History   Problem Relation Age of Onset   • Prostate cancer Brother    • Hypertension Brother    • Heart disease Brother    • Cancer Brother    • Cancer Mother    • Diabetes Mother    • Heart disease Sister    • Cancer Sister          Social History   Substance Use Topics   • Smoking status: Former Smoker   • Smokeless tobacco: Never Used   • Alcohol use No       Current Outpatient Prescriptions   Medication Sig Dispense Refill   • ascorbic acid (VITAMIN C) 500 MG tablet Take 500 mg by mouth Daily.     • docusate sodium (COLACE) 100 MG capsule Take 100 mg by mouth Daily.     • finasteride (PROSCAR) 5 MG tablet Take 1 tablet  "by mouth Daily. 30 tablet 11   • PROAIR  (90 Base) MCG/ACT inhaler INHALE 2 PUFFS EVERY 4 HOURS AS NEEDED  5   • vitamin B-12 (CYANOCOBALAMIN) 100 MCG tablet Take 50 mcg by mouth Daily.       No current facility-administered medications for this visit.          Allergies  Patient has no known allergies.    /67   Pulse 68   Temp 97.1 °F (36.2 °C)   Resp 20   Ht 175.3 cm (69.02\")   Wt 77.1 kg (170 lb)   SpO2 94%   BMI 25.09 kg/m²      Physical Exam  Gen. 89-year-old white [place]  Look excellent  Sutures removed        Assessment   Multiple skin cancers        Plan     Return in 1 month and will remove more lesions in the office  Patient's Body mass index is 25.09 kg/m². BMI is within normal parameters. No follow-up required.      Johnny Schulz MD        "

## 2018-08-28 ENCOUNTER — OFFICE VISIT (OUTPATIENT)
Dept: SURGERY | Facility: CLINIC | Age: 83
End: 2018-08-28

## 2018-08-28 VITALS
BODY MASS INDEX: 25.18 KG/M2 | SYSTOLIC BLOOD PRESSURE: 160 MMHG | HEIGHT: 69 IN | DIASTOLIC BLOOD PRESSURE: 82 MMHG | OXYGEN SATURATION: 95 % | TEMPERATURE: 98 F | WEIGHT: 170 LBS | RESPIRATION RATE: 18 BRPM | HEART RATE: 88 BPM

## 2018-08-28 DIAGNOSIS — C80.1 CANCER (HCC): ICD-10-CM

## 2018-08-28 DIAGNOSIS — L98.9 DISORDER OF SKIN OR SUBCUTANEOUS TISSUE: Primary | ICD-10-CM

## 2018-08-28 PROCEDURE — 99213 OFFICE O/P EST LOW 20 MIN: CPT | Performed by: SURGERY

## 2018-08-28 NOTE — PROGRESS NOTES
8/28/2018    Patient Information  Hal Zaragoza  [address] [facility] [place] [address]  3/18/1921  [phone] (home)     Chief Complaint   Patient presents with   • Skin Lesion     LESION REMOVAL       HPI  Patient's a 89-year-old white male with a large ulcerative lesion on the occiput of the scalp.  He is had multiple skin cancers removed in the past.  He    Review of Systems   Constitutional: Negative.    HENT: Negative.    Eyes: Negative.    Respiratory: Negative.    Cardiovascular: Negative.    Gastrointestinal: Negative.    Endocrine: Negative.    Genitourinary: Negative.    Musculoskeletal: Negative.    Skin: Positive for wound.   Allergic/Immunologic: Negative.    Neurological: Negative.    Hematological: Negative.    Psychiatric/Behavioral: Negative.      The Review of Systems has been reviewed and confirmed.    Patient Active Problem List   Diagnosis   • Cancer (CMS/HCC)   • Disorder of skin or subcutaneous tissue         Past Medical History:   Diagnosis Date   • Asthma    • Cancer (CMS/HCC)    • GERD (gastroesophageal reflux disease)    • Hiatal hernia    • Non Hodgkin's lymphoma (CMS/HCC)    • Skin lesions    • Vertigo     OCASSIONALLY         Past Surgical History:   Procedure Laterality Date   • EXTERNAL EAR SURGERY     • EYE SURGERY     • FRACTURE SURGERY     • HEAD/NECK LESION/CYST EXCISION Right 4/18/2018    Procedure: SKIN LESION EXCISION SCALP, RIGHT EAR, RIGHT NECK;  Surgeon: Johnny Schulz MD;  Location: [facility] OR;  Service: General   • HEAD/NECK LESION/CYST EXCISION N/A 7/16/2018    Procedure: Excision Multiple facial and neck lesions;  Surgeon: Johnny Schulz MD;  Location: [facility] OR;  Service: General   • MASTOID SURGERY     • SHOULDER SURGERY     • SKIN CANCER EXCISION           Family History   Problem Relation Age of Onset   • Prostate cancer Brother    • Hypertension Brother    • Heart disease Brother    • Cancer Brother    • Cancer Mother    • Diabetes Mother    • Heart disease Sister   "  • Cancer Sister          Social History   Substance Use Topics   • Smoking status: Former Smoker   • Smokeless tobacco: Never Used   • Alcohol use No       Current Outpatient Prescriptions   Medication Sig Dispense Refill   • ascorbic acid (VITAMIN C) 500 MG tablet Take 500 mg by mouth Daily.     • docusate sodium (COLACE) 100 MG capsule Take 100 mg by mouth Daily.     • finasteride (PROSCAR) 5 MG tablet Take 1 tablet by mouth Daily. 30 tablet 11   • PROAIR  (90 Base) MCG/ACT inhaler INHALE 2 PUFFS EVERY 4 HOURS AS NEEDED  5   • vitamin B-12 (CYANOCOBALAMIN) 100 MCG tablet Take 50 mcg by mouth Daily.       No current facility-administered medications for this visit.          Allergies  Patient has no known allergies.    /82   Pulse 88   Temp 98 °F (36.7 °C)   Resp 18   Ht 175.3 cm (69.02\")   Wt 77.1 kg (170 lb)   SpO2 95%   BMI 25.09 kg/m²      Physical Exam   Constitutional: He is oriented to person, place, and time. He appears well-developed and well-nourished. No distress.   HENT:   Head: Normocephalic.   Right Ear: External ear normal.   Left Ear: External ear normal.   Nose: Nose normal.   Mouth/Throat: Oropharynx is clear and moist.   Ulcerative lesion on the occipital scalp measuring approximately 2 cm   Eyes: Conjunctivae and EOM are normal. Right eye exhibits no discharge. Left eye exhibits no discharge.   Neck: Normal range of motion. No JVD present. No tracheal deviation present. No thyromegaly present.   Cardiovascular: Normal rate, regular rhythm, normal heart sounds and intact distal pulses.  Exam reveals no gallop and no friction rub.    No murmur heard.  Pulmonary/Chest: Effort normal and breath sounds normal. No stridor. No respiratory distress. He has no wheezes. He has no rales. He exhibits no tenderness.   Abdominal: Soft. Bowel sounds are normal. He exhibits no distension and no mass. There is no tenderness. There is no rebound and no guarding. No hernia.   Genitourinary: " Rectal exam shows guaiac negative stool.   Musculoskeletal: Normal range of motion. He exhibits no edema, tenderness or deformity.   Lymphadenopathy:     He has no cervical adenopathy.   Neurological: He is alert and oriented to person, place, and time. He has normal reflexes. He displays normal reflexes. No cranial nerve deficit. He exhibits normal muscle tone. Coordination normal.   Skin: Skin is warm and dry. No rash noted. He is not diaphoretic. No erythema. No pallor.   Psychiatric: He has a normal mood and affect. His behavior is normal. Judgment and thought content normal.             Assessment   Suspicious lesion on scalp        Plan     Excision in the OR  Patient's Body mass index is 25.09 kg/m². BMI is within normal parameters. No follow-up required.      Johnny Schulz MD

## 2018-08-30 ENCOUNTER — TELEPHONE (OUTPATIENT)
Dept: SURGERY | Facility: CLINIC | Age: 83
End: 2018-08-30

## 2018-09-05 ENCOUNTER — APPOINTMENT (OUTPATIENT)
Dept: PREADMISSION TESTING | Facility: HOSPITAL | Age: 83
End: 2018-09-05

## 2018-09-05 LAB
ANION GAP SERPL CALCULATED.3IONS-SCNC: 8.1 MMOL/L (ref 3.6–11.2)
BUN BLD-MCNC: 29 MG/DL (ref 7–21)
BUN/CREAT SERPL: 18.7 (ref 7–25)
CALCIUM SPEC-SCNC: 9.5 MG/DL (ref 7.7–10)
CHLORIDE SERPL-SCNC: 109 MMOL/L (ref 99–112)
CO2 SERPL-SCNC: 23.9 MMOL/L (ref 24.3–31.9)
CREAT BLD-MCNC: 1.55 MG/DL (ref 0.43–1.29)
DEPRECATED RDW RBC AUTO: 45.1 FL (ref 37–54)
ERYTHROCYTE [DISTWIDTH] IN BLOOD BY AUTOMATED COUNT: 12.8 % (ref 11.5–14.5)
GFR SERPL CREATININE-BSD FRML MDRD: 42 ML/MIN/1.73
GLUCOSE BLD-MCNC: 123 MG/DL (ref 70–110)
HCT VFR BLD AUTO: 44.6 % (ref 42–52)
HGB BLD-MCNC: 14.9 G/DL (ref 14–18)
MCH RBC QN AUTO: 32.7 PG (ref 27–33)
MCHC RBC AUTO-ENTMCNC: 33.4 G/DL (ref 33–37)
MCV RBC AUTO: 97.8 FL (ref 80–94)
OSMOLALITY SERPL CALC.SUM OF ELEC: 288.5 MOSM/KG (ref 273–305)
PLATELET # BLD AUTO: 196 10*3/MM3 (ref 130–400)
PMV BLD AUTO: 10.2 FL (ref 6–10)
POTASSIUM BLD-SCNC: 4.4 MMOL/L (ref 3.5–5.3)
RBC # BLD AUTO: 4.56 10*6/MM3 (ref 4.7–6.1)
SODIUM BLD-SCNC: 141 MMOL/L (ref 135–153)
WBC NRBC COR # BLD: 8.1 10*3/MM3 (ref 4.5–12.5)

## 2018-09-05 PROCEDURE — 85027 COMPLETE CBC AUTOMATED: CPT | Performed by: ANESTHESIOLOGY

## 2018-09-05 PROCEDURE — 80048 BASIC METABOLIC PNL TOTAL CA: CPT | Performed by: ANESTHESIOLOGY

## 2018-09-05 PROCEDURE — 36415 COLL VENOUS BLD VENIPUNCTURE: CPT

## 2018-09-05 NOTE — DISCHARGE INSTRUCTIONS
9/10/2017    TIME PER DR OFFICE    TAKE the following medications the morning of surgery:  All heart or blood pressure medications    HOLD all diabetic medications the morning of surgery as ordered by physician.    Please discontinue all blood thinners and anticoagulants (except aspirin) prior to surgery as per your surgeon and cardiologist instructions.  Aspirin may be continued up to the day prior to surgery.         General Instructions:  · Do not eat or drink after midnight: includes water, mints, or gum. You may brush your teeth.  Dental appliances that are removable must be taken out day of surgery.  · Do not smoke, chew tobacco, or drink alcohol.  · Bring medications in original bottles, any inhalers and if applicable your C-PAP/BI-PAP machine.  · Bring any papers given to you in the doctor's office.  · Wear clean comfortable clothes and socks.  · Do not wear contact lenses or make-up. Bring a case for your glasses if applicable.  · Bring crutches or walker if applicable.  · Leave all other valuables and jewelry at home.    If you were given a blood bank ID arm band remember to bring it with you the day of surgery.    Preventing a Surgical Site Infection:  Shower the night before surgery (unless instructed other wise) using a fresh bar of anti-bacterial soap (such as Dial) and clean washcloth. Dry with a clean towel and dress in clean clothing.  For 2 to 3 days before surgery, avoid shaving with a razor near where you will have surgery because the razor can irritate skin and make it easier to develop an infection. Ask your surgeon if you will be receiving antibiotics prior to surgery.  Make sure you, your family, and all healthcare providers clear their hands with soap and water or an alcohol-based hand  before caring for you or your wound.  If at all possible, quit smoking as many days before surgery as you can.    Day of surgery:  Upon arrival, a Pre-op nurse and Anesthesiologist will review your  health history, obtain vital signs, and answer questions you may have. The only belongings needed at this time will be your home medications and if applicable your C-PAP/BI-PAP machine. If you are staying overnight your family can leave the rest of your belongings in the car and bring them to your room later. A Pre-op nurse will start an IV and you may receive medication in preparation for surgery, including something to help you relax. Your family will be able to see you in the Pre-op area. While you are in surgery your family should notify the waiting room  if they leave the waiting room area and provide a contact phone number.    Please be aware that surgery does come with discomfort. We want to make every effort to control your discomfort so please discuss any uncontrolled symptoms with your nurse. Your doctor will most likely have prescribed pain medications.    If you are going home after surgery you will receive individualized written care instructions before being discharged. A responsible adult must drive you to and from the hospital on the day of surgery and stay with you for 24 hours.    If you are staying overnight following surgery, you will be transported to your hospital room following the recovery period.  [facility] has all private rooms.    If you have any questions please call Pre-Admission Testing at [phone].  Deductibles and co-payments are collected on the day of service. Please be prepared to pay the required co-pay, deductible or deposit on the day of service as defined by your plan.

## 2018-09-07 ENCOUNTER — APPOINTMENT (OUTPATIENT)
Dept: PREADMISSION TESTING | Facility: HOSPITAL | Age: 83
End: 2018-09-07

## 2018-09-07 ENCOUNTER — TELEPHONE (OUTPATIENT)
Dept: SURGERY | Facility: CLINIC | Age: 83
End: 2018-09-07

## 2018-09-10 ENCOUNTER — ANESTHESIA EVENT (OUTPATIENT)
Dept: PERIOP | Facility: HOSPITAL | Age: 83
End: 2018-09-10

## 2018-09-10 ENCOUNTER — HOSPITAL ENCOUNTER (OUTPATIENT)
Facility: HOSPITAL | Age: 83
Setting detail: HOSPITAL OUTPATIENT SURGERY
Discharge: HOME | End: 2018-09-10
Attending: SURGERY | Admitting: SURGERY

## 2018-09-10 ENCOUNTER — ANESTHESIA (OUTPATIENT)
Dept: PERIOP | Facility: HOSPITAL | Age: 83
End: 2018-09-10

## 2018-09-10 VITALS
OXYGEN SATURATION: 100 % | RESPIRATION RATE: 16 BRPM | TEMPERATURE: 98 F | HEART RATE: 89 BPM | WEIGHT: 165 LBS | BODY MASS INDEX: 23.62 KG/M2 | SYSTOLIC BLOOD PRESSURE: 162 MMHG | HEIGHT: 70 IN | DIASTOLIC BLOOD PRESSURE: 82 MMHG

## 2018-09-10 DIAGNOSIS — C80.1 CANCER (HCC): ICD-10-CM

## 2018-09-10 DIAGNOSIS — L98.9 DISORDER OF SKIN OR SUBCUTANEOUS TISSUE: ICD-10-CM

## 2018-09-10 PROCEDURE — 17270 DSTR MAL LES S/N/H/F/G .5 /<: CPT | Performed by: SURGERY

## 2018-09-10 PROCEDURE — 25010000002 PROPOFOL 1000 MG/ML EMULSION: Performed by: NURSE ANESTHETIST, CERTIFIED REGISTERED

## 2018-09-10 PROCEDURE — 25010000002 FENTANYL CITRATE (PF) 100 MCG/2ML SOLUTION: Performed by: NURSE ANESTHETIST, CERTIFIED REGISTERED

## 2018-09-10 PROCEDURE — 17260 DSTRJ MAL LES T/A/L 0.5 CM/<: CPT | Performed by: SURGERY

## 2018-09-10 PROCEDURE — 88305 TISSUE EXAM BY PATHOLOGIST: CPT | Performed by: SURGERY

## 2018-09-10 PROCEDURE — 25010000002 PROPOFOL 10 MG/ML EMULSION: Performed by: NURSE ANESTHETIST, CERTIFIED REGISTERED

## 2018-09-10 PROCEDURE — 11623 EXC S/N/H/F/G MAL+MRG 2.1-3: CPT | Performed by: SURGERY

## 2018-09-10 RX ORDER — BUPIVACAINE HYDROCHLORIDE AND EPINEPHRINE 5; 5 MG/ML; UG/ML
INJECTION, SOLUTION PERINEURAL AS NEEDED
Status: DISCONTINUED | OUTPATIENT
Start: 2018-09-10 | End: 2018-09-10 | Stop reason: HOSPADM

## 2018-09-10 RX ORDER — PROPOFOL 10 MG/ML
VIAL (ML) INTRAVENOUS AS NEEDED
Status: DISCONTINUED | OUTPATIENT
Start: 2018-09-10 | End: 2018-09-10 | Stop reason: SURG

## 2018-09-10 RX ORDER — BACITRACIN, NEOMYCIN, POLYMYXIN B 400; 3.5; 5 [USP'U]/G; MG/G; [USP'U]/G
OINTMENT TOPICAL AS NEEDED
Status: DISCONTINUED | OUTPATIENT
Start: 2018-09-10 | End: 2018-09-10 | Stop reason: HOSPADM

## 2018-09-10 RX ORDER — MAGNESIUM HYDROXIDE 1200 MG/15ML
LIQUID ORAL AS NEEDED
Status: DISCONTINUED | OUTPATIENT
Start: 2018-09-10 | End: 2018-09-10 | Stop reason: HOSPADM

## 2018-09-10 RX ORDER — SODIUM CHLORIDE, SODIUM LACTATE, POTASSIUM CHLORIDE, CALCIUM CHLORIDE 600; 310; 30; 20 MG/100ML; MG/100ML; MG/100ML; MG/100ML
125 INJECTION, SOLUTION INTRAVENOUS CONTINUOUS
Status: DISCONTINUED | OUTPATIENT
Start: 2018-09-10 | End: 2018-09-10 | Stop reason: HOSPADM

## 2018-09-10 RX ORDER — IPRATROPIUM BROMIDE AND ALBUTEROL SULFATE 2.5; .5 MG/3ML; MG/3ML
3 SOLUTION RESPIRATORY (INHALATION) ONCE AS NEEDED
Status: DISCONTINUED | OUTPATIENT
Start: 2018-09-10 | End: 2018-09-10 | Stop reason: HOSPADM

## 2018-09-10 RX ORDER — SODIUM CHLORIDE 0.9 % (FLUSH) 0.9 %
1-10 SYRINGE (ML) INJECTION AS NEEDED
Status: DISCONTINUED | OUTPATIENT
Start: 2018-09-10 | End: 2018-09-10 | Stop reason: HOSPADM

## 2018-09-10 RX ORDER — MEPERIDINE HYDROCHLORIDE 50 MG/ML
12.5 INJECTION INTRAMUSCULAR; INTRAVENOUS; SUBCUTANEOUS
Status: DISCONTINUED | OUTPATIENT
Start: 2018-09-10 | End: 2018-09-10 | Stop reason: HOSPADM

## 2018-09-10 RX ORDER — OXYCODONE HYDROCHLORIDE AND ACETAMINOPHEN 5; 325 MG/1; MG/1
1 TABLET ORAL ONCE AS NEEDED
Status: DISCONTINUED | OUTPATIENT
Start: 2018-09-10 | End: 2018-09-10 | Stop reason: HOSPADM

## 2018-09-10 RX ORDER — FENTANYL CITRATE 50 UG/ML
INJECTION, SOLUTION INTRAMUSCULAR; INTRAVENOUS AS NEEDED
Status: DISCONTINUED | OUTPATIENT
Start: 2018-09-10 | End: 2018-09-10 | Stop reason: SURG

## 2018-09-10 RX ORDER — ONDANSETRON 2 MG/ML
4 INJECTION INTRAMUSCULAR; INTRAVENOUS ONCE AS NEEDED
Status: DISCONTINUED | OUTPATIENT
Start: 2018-09-10 | End: 2018-09-10 | Stop reason: HOSPADM

## 2018-09-10 RX ORDER — FENTANYL CITRATE 50 UG/ML
50 INJECTION, SOLUTION INTRAMUSCULAR; INTRAVENOUS
Status: DISCONTINUED | OUTPATIENT
Start: 2018-09-10 | End: 2018-09-10 | Stop reason: HOSPADM

## 2018-09-10 RX ORDER — LIDOCAINE HYDROCHLORIDE 20 MG/ML
INJECTION, SOLUTION INFILTRATION; PERINEURAL AS NEEDED
Status: DISCONTINUED | OUTPATIENT
Start: 2018-09-10 | End: 2018-09-10 | Stop reason: SURG

## 2018-09-10 RX ADMIN — SODIUM CHLORIDE, POTASSIUM CHLORIDE, SODIUM LACTATE AND CALCIUM CHLORIDE: 600; 310; 30; 20 INJECTION, SOLUTION INTRAVENOUS at 12:04

## 2018-09-10 RX ADMIN — FENTANYL CITRATE 100 MCG: 50 INJECTION INTRAMUSCULAR; INTRAVENOUS at 12:05

## 2018-09-10 RX ADMIN — LIDOCAINE HYDROCHLORIDE 40 MG: 20 INJECTION, SOLUTION INFILTRATION; PERINEURAL at 12:04

## 2018-09-10 RX ADMIN — EPHEDRINE SULFATE 10 MG: 50 INJECTION INTRAMUSCULAR; INTRAVENOUS; SUBCUTANEOUS at 12:39

## 2018-09-10 RX ADMIN — EPHEDRINE SULFATE 10 MG: 50 INJECTION INTRAMUSCULAR; INTRAVENOUS; SUBCUTANEOUS at 12:35

## 2018-09-10 RX ADMIN — PROPOFOL 20 MG: 10 INJECTION, EMULSION INTRAVENOUS at 12:08

## 2018-09-10 RX ADMIN — EPHEDRINE SULFATE 10 MG: 50 INJECTION INTRAMUSCULAR; INTRAVENOUS; SUBCUTANEOUS at 12:32

## 2018-09-10 RX ADMIN — PROPOFOL 150 MCG/KG/MIN: 10 INJECTION, EMULSION INTRAVENOUS at 12:08

## 2018-09-10 NOTE — H&P (VIEW-ONLY)
8/28/2018    Patient Information  Hal Zaragoza  [address] [facility] [place] [address]  3/18/1921  [phone] (home)     Chief Complaint   Patient presents with   • Skin Lesion     LESION REMOVAL       HPI  Patient's a 89-year-old white male with a large ulcerative lesion on the occiput of the scalp.  He is had multiple skin cancers removed in the past.  He    Review of Systems   Constitutional: Negative.    HENT: Negative.    Eyes: Negative.    Respiratory: Negative.    Cardiovascular: Negative.    Gastrointestinal: Negative.    Endocrine: Negative.    Genitourinary: Negative.    Musculoskeletal: Negative.    Skin: Positive for wound.   Allergic/Immunologic: Negative.    Neurological: Negative.    Hematological: Negative.    Psychiatric/Behavioral: Negative.      The Review of Systems has been reviewed and confirmed.    Patient Active Problem List   Diagnosis   • Cancer (CMS/HCC)   • Disorder of skin or subcutaneous tissue         Past Medical History:   Diagnosis Date   • Asthma    • Cancer (CMS/HCC)    • GERD (gastroesophageal reflux disease)    • Hiatal hernia    • Non Hodgkin's lymphoma (CMS/HCC)    • Skin lesions    • Vertigo     OCASSIONALLY         Past Surgical History:   Procedure Laterality Date   • EXTERNAL EAR SURGERY     • EYE SURGERY     • FRACTURE SURGERY     • HEAD/NECK LESION/CYST EXCISION Right 4/18/2018    Procedure: SKIN LESION EXCISION SCALP, RIGHT EAR, RIGHT NECK;  Surgeon: Johnny Schulz MD;  Location: [facility] OR;  Service: General   • HEAD/NECK LESION/CYST EXCISION N/A 7/16/2018    Procedure: Excision Multiple facial and neck lesions;  Surgeon: Johnny Schulz MD;  Location: [facility] OR;  Service: General   • MASTOID SURGERY     • SHOULDER SURGERY     • SKIN CANCER EXCISION           Family History   Problem Relation Age of Onset   • Prostate cancer Brother    • Hypertension Brother    • Heart disease Brother    • Cancer Brother    • Cancer Mother    • Diabetes Mother    • Heart disease Sister   "  • Cancer Sister          Social History   Substance Use Topics   • Smoking status: Former Smoker   • Smokeless tobacco: Never Used   • Alcohol use No       Current Outpatient Prescriptions   Medication Sig Dispense Refill   • ascorbic acid (VITAMIN C) 500 MG tablet Take 500 mg by mouth Daily.     • docusate sodium (COLACE) 100 MG capsule Take 100 mg by mouth Daily.     • finasteride (PROSCAR) 5 MG tablet Take 1 tablet by mouth Daily. 30 tablet 11   • PROAIR  (90 Base) MCG/ACT inhaler INHALE 2 PUFFS EVERY 4 HOURS AS NEEDED  5   • vitamin B-12 (CYANOCOBALAMIN) 100 MCG tablet Take 50 mcg by mouth Daily.       No current facility-administered medications for this visit.          Allergies  Patient has no known allergies.    /82   Pulse 88   Temp 98 °F (36.7 °C)   Resp 18   Ht 175.3 cm (69.02\")   Wt 77.1 kg (170 lb)   SpO2 95%   BMI 25.09 kg/m²      Physical Exam   Constitutional: He is oriented to person, place, and time. He appears well-developed and well-nourished. No distress.   HENT:   Head: Normocephalic.   Right Ear: External ear normal.   Left Ear: External ear normal.   Nose: Nose normal.   Mouth/Throat: Oropharynx is clear and moist.   Ulcerative lesion on the occipital scalp measuring approximately 2 cm   Eyes: Conjunctivae and EOM are normal. Right eye exhibits no discharge. Left eye exhibits no discharge.   Neck: Normal range of motion. No JVD present. No tracheal deviation present. No thyromegaly present.   Cardiovascular: Normal rate, regular rhythm, normal heart sounds and intact distal pulses.  Exam reveals no gallop and no friction rub.    No murmur heard.  Pulmonary/Chest: Effort normal and breath sounds normal. No stridor. No respiratory distress. He has no wheezes. He has no rales. He exhibits no tenderness.   Abdominal: Soft. Bowel sounds are normal. He exhibits no distension and no mass. There is no tenderness. There is no rebound and no guarding. No hernia.   Genitourinary: " Rectal exam shows guaiac negative stool.   Musculoskeletal: Normal range of motion. He exhibits no edema, tenderness or deformity.   Lymphadenopathy:     He has no cervical adenopathy.   Neurological: He is alert and oriented to person, place, and time. He has normal reflexes. He displays normal reflexes. No cranial nerve deficit. He exhibits normal muscle tone. Coordination normal.   Skin: Skin is warm and dry. No rash noted. He is not diaphoretic. No erythema. No pallor.   Psychiatric: He has a normal mood and affect. His behavior is normal. Judgment and thought content normal.             Assessment   Suspicious lesion on scalp        Plan     Excision in the OR  Patient's Body mass index is 25.09 kg/m². BMI is within normal parameters. No follow-up required.      Johnny Schulz MD

## 2018-09-10 NOTE — ANESTHESIA PREPROCEDURE EVALUATION
Anesthesia Evaluation     Patient summary reviewed and Nursing notes reviewed   no history of anesthetic complications:  NPO Solid Status: > 8 hours  NPO Liquid Status: > 8 hours           Airway   Mallampati: II  TM distance: >3 FB  Neck ROM: limited  No difficulty expected  Dental - normal exam   (+) lower dentures and upper dentures    Pulmonary - normal exam    breath sounds clear to auscultation  (+) a smoker Former, asthma, shortness of breath,   Cardiovascular - negative cardio ROS and normal exam  Exercise tolerance: good (4-7 METS)    NYHA Classification: II  ECG reviewed  Rhythm: regular  Rate: normal    (-) hypertension, past MI, dysrhythmias, angina, CHF      Neuro/Psych  (+) neuromuscular disease (h/o vertigo), TIA, dizziness/light headedness, weakness,     GI/Hepatic/Renal/Endo    (+)  hiatal hernia, GERD well controlled,      Musculoskeletal     (+) back pain, gait problem,   Abdominal  - normal exam    Bowel sounds: normal.   Substance History - negative use     OB/GYN negative ob/gyn ROS         Other   (+) arthritis   history of cancer remission                      Anesthesia Plan    ASA 3     general     intravenous induction   Anesthetic plan, all risks, benefits, and alternatives have been provided, discussed and informed consent has been obtained with: patient and spouse/significant other.  Use of blood products discussed with patient and spouse/significant other  Consented to blood products.

## 2018-09-10 NOTE — ANESTHESIA POSTPROCEDURE EVALUATION
Patient: Hal Zaragoza    Procedure Summary     Date:  09/10/18 Room / [facility] /  [facility]    Anesthesia Start:  1204 Anesthesia Stop:  1258    Procedure:  SKIN LESION EXCISION SCALP AND LEFT FOREARM. (N/A ) Diagnosis:       Cancer (CMS/HCC)      Disorder of skin or subcutaneous tissue      (Cancer (CMS/HCC) [C80.1])      (Disorder of skin or subcutaneous tissue [L98.9])    Surgeon:  Johnny Schulz MD Provider:  Carroll Morgan MD    Anesthesia Type:  general ASA Status:  3          Anesthesia Type: general  Last vitals  BP   162/82 (09/10/18 1340)   Temp   98 °F (36.7 °C) (09/10/18 1333)   Pulse   89 (09/10/18 1340)   Resp   16 (09/10/18 1340)     SpO2   100 % (09/10/18 1340)     Post Anesthesia Care and Evaluation    Patient location during evaluation: PHASE II  Patient participation: complete - patient participated  Level of consciousness: awake and alert  Pain score: 1  Pain management: adequate  Airway patency: patent  Anesthetic complications: No anesthetic complications  PONV Status: controlled  Cardiovascular status: acceptable  Respiratory status: acceptable  Hydration status: acceptable

## 2018-09-10 NOTE — NURSING NOTE
Dr thompson at beside. Patient wants dr thompson to remove lesions from left forearm. Added to consent and verified.

## 2018-09-12 ENCOUNTER — HOSPITAL ENCOUNTER (OUTPATIENT)
Dept: GENERAL RADIOLOGY | Facility: HOSPITAL | Age: 83
Discharge: HOME | End: 2018-09-12
Admitting: FAMILY MEDICINE

## 2018-09-12 ENCOUNTER — TRANSCRIBE ORDERS (OUTPATIENT)
Dept: GENERAL RADIOLOGY | Facility: HOSPITAL | Age: 83
End: 2018-09-12

## 2018-09-12 DIAGNOSIS — S80.02XA CONTUSION OF LEFT KNEE, INITIAL ENCOUNTER: ICD-10-CM

## 2018-09-12 DIAGNOSIS — S80.02XA CONTUSION OF LEFT KNEE, INITIAL ENCOUNTER: Primary | ICD-10-CM

## 2018-09-12 LAB
LAB AP CASE REPORT: NORMAL
PATH REPORT.FINAL DX SPEC: NORMAL

## 2018-09-12 PROCEDURE — 73562 X-RAY EXAM OF KNEE 3: CPT

## 2018-09-12 PROCEDURE — 73562 X-RAY EXAM OF KNEE 3: CPT | Performed by: RADIOLOGY

## 2018-09-25 ENCOUNTER — OFFICE VISIT (OUTPATIENT)
Dept: SURGERY | Facility: CLINIC | Age: 83
End: 2018-09-25

## 2018-09-25 VITALS
WEIGHT: 165 LBS | OXYGEN SATURATION: 96 % | HEART RATE: 78 BPM | DIASTOLIC BLOOD PRESSURE: 90 MMHG | RESPIRATION RATE: 17 BRPM | HEIGHT: 70 IN | TEMPERATURE: 98.2 F | BODY MASS INDEX: 23.62 KG/M2 | SYSTOLIC BLOOD PRESSURE: 144 MMHG

## 2018-09-25 DIAGNOSIS — C80.1 CANCER (HCC): Primary | ICD-10-CM

## 2018-09-25 PROCEDURE — 99213 OFFICE O/P EST LOW 20 MIN: CPT | Performed by: SURGERY

## 2018-09-25 RX ORDER — FLUOROURACIL 50 MG/G
CREAM TOPICAL DAILY
Qty: 1 EACH | Refills: 5 | Status: SHIPPED | OUTPATIENT
Start: 2018-09-25 | End: 2018-10-25

## 2018-09-25 NOTE — PROGRESS NOTES
9/25/2018    Patient Information  Hal Zaragoza  [address] [facility] [address]  3/18/1921  [phone] (home)     Chief Complaint   Patient presents with   • Post-op     POST EXCISION OF MULTIPLE SKIN LESIONS       HPI  Patient is a 89-year-old white male here for follow-up of excision of skin lesions and also cautery of skin lesions.  The lesion I removed from his head was a squamous cell carcinoma    Review of Systems  The Review of Systems has been reviewed and confirmed.    Patient Active Problem List   Diagnosis   • Cancer (CMS/HCC)   • Disorder of skin or subcutaneous tissue         Past Medical History:   Diagnosis Date   • Asthma    • Cancer (CMS/HCC)    • GERD (gastroesophageal reflux disease)    • Hiatal hernia    • Non Hodgkin's lymphoma (CMS/HCC)    • Skin lesions    • Stroke (CMS/HCC)     TIA   • Vertigo     OCASSIONALLY         Past Surgical History:   Procedure Laterality Date   • COLONOSCOPY     • EXTERNAL EAR SURGERY     • EYE SURGERY     • FRACTURE SURGERY     • HEAD/NECK LESION/CYST EXCISION Right 4/18/2018    Procedure: SKIN LESION EXCISION SCALP, RIGHT EAR, RIGHT NECK;  Surgeon: Johnny Schulz MD;  [facility];  Service: General   • HEAD/NECK LESION/CYST EXCISION N/A 7/16/2018    Procedure: Excision Multiple facial and neck lesions;  Surgeon: Johnny Schulz MD;  [facility];  Service: General   • HEAD/NECK LESION/CYST EXCISION N/A 9/10/2018    Procedure: SKIN LESION EXCISION SCALP AND LEFT FOREARM.;  Surgeon: Johnny Schulz MD;  [facility]  [facility];  Service: General   • MASTOID SURGERY     • SHOULDER SURGERY     • SKIN CANCER EXCISION           Family History   Problem Relation Age of Onset   • Prostate cancer Brother    • Hypertension Brother    • Heart disease Brother    • Cancer Brother    • Cancer Mother    • Diabetes Mother    • Heart disease Sister    • Cancer Sister          Social History   Substance Use Topics   • Smoking status: Former Smoker   • Smokeless  "tobacco: Never Used   • Alcohol use No       Current Outpatient Prescriptions   Medication Sig Dispense Refill   • ascorbic acid (VITAMIN C) 500 MG tablet Take 500 mg by mouth Daily.     • docusate sodium (COLACE) 100 MG capsule Take 100 mg by mouth Daily.     • finasteride (PROSCAR) 5 MG tablet Take 1 tablet by mouth Daily. 30 tablet 11   • PROAIR  (90 Base) MCG/ACT inhaler INHALE 2 PUFFS EVERY 4 HOURS AS NEEDED  5   • vitamin B-12 (CYANOCOBALAMIN) 100 MCG tablet Take 50 mcg by mouth Daily.       No current facility-administered medications for this visit.          Allergies  Patient has no known allergies.    /90   Pulse 78   Temp 98.2 °F (36.8 °C) (Oral)   Resp 17   Ht 177.8 cm (70\")   Wt 74.8 kg (165 lb)   SpO2 96%   BMI 23.68 kg/m²      Physical Exam  Gen. 89-year-old white male no distress  Wounds look good.  Sutures removed    He is developed a new lesion on his left cheek        Assessment   Multiple skin lesions and squamous cell skin cancer        Plan     Efudex for lesion on left cheek    Patient's Body mass index is 23.68 kg/m². BMI is within normal parameters. No follow-up required.      Johnny Schulz MD        "

## 2018-10-11 ENCOUNTER — OFFICE VISIT (OUTPATIENT)
Dept: SURGERY | Facility: CLINIC | Age: 83
End: 2018-10-11

## 2018-10-11 VITALS
DIASTOLIC BLOOD PRESSURE: 62 MMHG | SYSTOLIC BLOOD PRESSURE: 126 MMHG | HEIGHT: 70 IN | TEMPERATURE: 98.3 F | HEART RATE: 71 BPM | OXYGEN SATURATION: 96 % | WEIGHT: 165 LBS | BODY MASS INDEX: 23.62 KG/M2 | RESPIRATION RATE: 19 BRPM

## 2018-10-11 DIAGNOSIS — C80.1 CANCER (HCC): Primary | ICD-10-CM

## 2018-10-11 DIAGNOSIS — L98.9 DISORDER OF SKIN OR SUBCUTANEOUS TISSUE: ICD-10-CM

## 2018-10-11 PROCEDURE — 99212 OFFICE O/P EST SF 10 MIN: CPT | Performed by: SURGERY

## 2018-10-11 NOTE — PROGRESS NOTES
10/11/2018    Patient Information  Hal Zaragoza  [address] [facility] [address]  3/18/1921  [phone] (home)     Chief Complaint   Patient presents with   • Burning Eyes     Efudex in Eye       HPI  Patient is a 89-year-old white male with multiple skin cancers and actinic keratosis.  He is using Efudex cream    Review of Systems:    The Past medical history, family history, social history, medication list, allergies, and Review of Systems has been reviewed and confirmed.      General: negative  Integumentary: negative  Eyes: burning  ENT: negative  Respiratory: negative  Gastrointestinal: negative  Cardiovascular: negative  Neurological: negative  Psychiatric: negative  Hematologic/Lymphatic: negative  Genitourinary: negative  Musculoskeletal: negative  Endocrine: negative  Breasts: negative      Patient Active Problem List   Diagnosis   • Cancer (CMS/HCC)   • Disorder of skin or subcutaneous tissue         Past Medical History:   Diagnosis Date   • Asthma    • Cancer (CMS/HCC)    • GERD (gastroesophageal reflux disease)    • Hiatal hernia    • Non Hodgkin's lymphoma (CMS/HCC)    • Skin lesions    • Stroke (CMS/HCC)     TIA   • Vertigo     OCASSIONALLY         Past Surgical History:   Procedure Laterality Date   • COLONOSCOPY     • EXTERNAL EAR SURGERY     • EYE SURGERY     • FRACTURE SURGERY     • HEAD/NECK LESION/CYST EXCISION Right 4/18/2018    Procedure: SKIN LESION EXCISION SCALP, RIGHT EAR, RIGHT NECK;  Surgeon: Johnny Schulz MD;  Location: [facility] OR;  Service: General   • HEAD/NECK LESION/CYST EXCISION N/A 7/16/2018    Procedure: Excision Multiple facial and neck lesions;  Surgeon: Johnny Schulz MD;  Location: [facility] OR;  Service: General   • HEAD/NECK LESION/CYST EXCISION N/A 9/10/2018    Procedure: SKIN LESION EXCISION SCALP AND LEFT FOREARM.;  Surgeon: Johnny Schulz MD;  Location: [facility] OR;  Service: General   • MASTOID SURGERY     • SHOULDER SURGERY     • SKIN CANCER EXCISION    "        Family History   Problem Relation Age of Onset   • Prostate cancer Brother    • Hypertension Brother    • Heart disease Brother    • Cancer Brother    • Cancer Mother    • Diabetes Mother    • Heart disease Sister    • Cancer Sister          Social History   Substance Use Topics   • Smoking status: Former Smoker   • Smokeless tobacco: Never Used   • Alcohol use No       Current Outpatient Prescriptions   Medication Sig Dispense Refill   • ascorbic acid (VITAMIN C) 500 MG tablet Take 500 mg by mouth Daily.     • docusate sodium (COLACE) 100 MG capsule Take 100 mg by mouth Daily.     • finasteride (PROSCAR) 5 MG tablet Take 1 tablet by mouth Daily. 30 tablet 11   • fluorouracil (EFUDEX) 5 % cream Apply  topically to the appropriate area as directed Daily for 30 days. 1 each 5   • PROAIR  (90 Base) MCG/ACT inhaler INHALE 2 PUFFS EVERY 4 HOURS AS NEEDED  5   • vitamin B-12 (CYANOCOBALAMIN) 100 MCG tablet Take 50 mcg by mouth Daily.       No current facility-administered medications for this visit.          Allergies  Patient has no known allergies.    /62   Pulse 71   Temp 98.3 °F (36.8 °C)   Resp 19   Ht 177.8 cm (70\")   Wt 74.8 kg (165 lb)   SpO2 96%   BMI 23.68 kg/m²      Physical Exam  Gen. 89-year-old white male no distress  Multiple areas on face and scalp which are red and irritated            ASSESSMENT  Skin cancers using Efudex        PLAN    Stop Efudex.  Return 2 weeks    Patient's Body mass index is 23.68 kg/m². BMI is within normal parameters. No follow-up required.           This document signed by Johnny Schulz MD October 11, 2018 12:51 PM   "

## 2019-12-11 ENCOUNTER — TELEPHONE (OUTPATIENT)
Dept: SURGERY | Facility: CLINIC | Age: 84
End: 2019-12-11

## 2019-12-11 NOTE — TELEPHONE ENCOUNTER
PATIENT HAS AN APPT AT 3:30 ON 12/12. DR VILLALOBOS WON'T BE IN THE OFFICE THAT AFTERNOON. I'VE CALLED SEVERAL TIMES TO BOTH NUMBERS BUT CAN'T REACH PATIENT TO GIVE HIM A NEW TIME TO COME IN.

## 2019-12-12 ENCOUNTER — OFFICE VISIT (OUTPATIENT)
Dept: SURGERY | Facility: CLINIC | Age: 84
End: 2019-12-12

## 2019-12-12 VITALS
SYSTOLIC BLOOD PRESSURE: 183 MMHG | OXYGEN SATURATION: 93 % | HEIGHT: 70 IN | DIASTOLIC BLOOD PRESSURE: 90 MMHG | HEART RATE: 66 BPM | TEMPERATURE: 98.5 F | WEIGHT: 175 LBS | BODY MASS INDEX: 25.05 KG/M2 | RESPIRATION RATE: 19 BRPM

## 2019-12-12 DIAGNOSIS — C80.1 CANCER (HCC): ICD-10-CM

## 2019-12-12 DIAGNOSIS — L98.9 DISORDER OF SKIN OR SUBCUTANEOUS TISSUE: Primary | ICD-10-CM

## 2019-12-12 PROCEDURE — 99212 OFFICE O/P EST SF 10 MIN: CPT | Performed by: SURGERY

## 2019-12-12 RX ORDER — APIXABAN 2.5 MG/1
2.5 TABLET, FILM COATED ORAL 2 TIMES DAILY
Refills: 5 | COMMUNITY
Start: 2019-12-04

## 2019-12-12 RX ORDER — LEVOTHYROXINE SODIUM 0.03 MG/1
25 TABLET ORAL DAILY
Refills: 5 | COMMUNITY
Start: 2019-09-27

## 2019-12-12 RX ORDER — GABAPENTIN 100 MG/1
200 CAPSULE ORAL 3 TIMES DAILY
Refills: 2 | COMMUNITY
Start: 2019-11-29

## 2019-12-12 NOTE — PROGRESS NOTES
12/12/2019    Patient Information  Hal Zaragoza  [address] [place] [address] [place] [address]  3/18/1921  There are no phone numbers on file.    Chief Complaint  Chief Complaint   Patient presents with   • Suspicious Skin Lesion     Multiple Scalp/Facial Lesions       HPI  Patient is a 89-year-old white male whose had multiple skin cancers on the scalp and face in the past.  He has been using Efudex recently on some lesions and his daughter reports that these lesions look much better.  They want to get my opinion on this    Past Medical History  Past Medical History:   Diagnosis Date   • Asthma    • Atrial fibrillation (CMS/HCC)    • Cancer (CMS/HCC)    • GERD (gastroesophageal reflux disease)    • Hiatal hernia    • Non Hodgkin's lymphoma (CMS/HCC)    • Skin lesions    • Stroke (CMS/HCC)     TIA   • Thyroid disease    • Vertigo     OCASSIONALLY       Past Surgical History  Past Surgical History:   Procedure Laterality Date   • COLONOSCOPY     • EXTERNAL EAR SURGERY     • EYE SURGERY     • FRACTURE SURGERY     • HEAD/NECK LESION/CYST EXCISION Right 4/18/2018    Procedure: SKIN LESION EXCISION SCALP, RIGHT EAR, RIGHT NECK;  Surgeon: Johnny Schulz MD;  Location: [facility] OR;  Service: General   • HEAD/NECK LESION/CYST EXCISION N/A 7/16/2018    Procedure: Excision Multiple facial and neck lesions;  Surgeon: Johnny Schulz MD;  Location: [facility] OR;  Service: General   • HEAD/NECK LESION/CYST EXCISION N/A 9/10/2018    Procedure: SKIN LESION EXCISION SCALP AND LEFT FOREARM.;  Surgeon: Johnny Schulz MD;  Location: [facility] OR;  Service: General   • HIP SURGERY     • MASTOID SURGERY     • SHOULDER SURGERY     • SKIN CANCER EXCISION         Current Medications  Current Outpatient Medications   Medication Sig Dispense Refill   • ascorbic acid (VITAMIN C) 500 MG tablet Take 500 mg by mouth Daily.     • docusate sodium (COLACE) 100 MG capsule Take 100 mg by mouth Daily.     • ELIQUIS 2.5 MG tablet tablet Take 2.5 mg by mouth 2 (Two)  "Times a Day.  5   • finasteride (PROSCAR) 5 MG tablet Take 1 tablet by mouth Daily. 30 tablet 11   • gabapentin (NEURONTIN) 100 MG capsule Take 200 mg by mouth 3 (Three) Times a Day.  2   • levothyroxine (SYNTHROID, LEVOTHROID) 25 MCG tablet Take 25 mcg by mouth Daily.  5   • PROAIR  (90 Base) MCG/ACT inhaler INHALE 2 PUFFS EVERY 4 HOURS AS NEEDED  5   • vitamin B-12 (CYANOCOBALAMIN) 100 MCG tablet Take 50 mcg by mouth Daily.       No current facility-administered medications for this visit.        Allergies  Patient has no known allergies.      Review of Systems    The Past medical history, family history, social history, medication list, allergies, and Review of Systems has been reviewed and confirmed.    General: weight gain 10lbs  Integumentary: non-healing wound  Eyes: eyesight problems, glasses  ENT: hearing loss  Respiratory: shortness of breath and wheezing  Gastrointestinal: heartburn and constipation  Cardiovascular: negative  Neurological: negative  Psychiatric: negative  Hematologic/Lymphatic: easy bleeding and easy bruising  Genitourinary: negative  Musculoskeletal: negative  Endocrine: negative  Breasts: negative      Vitals:  Vitals:    12/12/19 1144   BP: (!) 183/90   Pulse: 66   Resp: 19   Temp: 98.5 °F (36.9 °C)   SpO2: 93%         12/12/19  1144   Weight: 79.4 kg (175 lb)     177.8 cm (70\")  Body mass index is 25.11 kg/m².     Physical Exam  Physical Exam  General a 89-year-old white male no distress    He has multiple lesions on the face and scalp with some redness but are resolving.  Assessment   Actinic keratosis responding to Efudex    Plan  Return 1 month for repeat check        This document signed by Johnny Schulz MD December 12, 2019 11:59 AM         "

## 2020-01-16 ENCOUNTER — TELEPHONE (OUTPATIENT)
Dept: SURGERY | Facility: CLINIC | Age: 85
End: 2020-01-16

## 2020-01-16 NOTE — TELEPHONE ENCOUNTER
PATIENT DIDN'T SHOW UP FOR HIS APPT. I CALLED TO [facility] BUT NO ANSWER. I LEFT A MESSAGE TO RETURN MY CALL CONCERNING THIS APPT.

## 2020-01-22 ENCOUNTER — TELEPHONE (OUTPATIENT)
Dept: SURGERY | Facility: CLINIC | Age: 85
End: 2020-01-22

## 2020-05-04 ENCOUNTER — LAB REQUISITION (OUTPATIENT)
Dept: LAB | Facility: HOSPITAL | Age: 85
End: 2020-05-04

## 2020-05-04 DIAGNOSIS — I50.22 CHRONIC SYSTOLIC (CONGESTIVE) HEART FAILURE (HCC): ICD-10-CM

## 2020-05-04 DIAGNOSIS — E78.5 HYPERLIPIDEMIA, UNSPECIFIED: ICD-10-CM

## 2020-05-04 DIAGNOSIS — E13.21 OTHER SPECIFIED DIABETES MELLITUS WITH DIABETIC NEPHROPATHY (HCC): ICD-10-CM

## 2020-05-04 DIAGNOSIS — I10 ESSENTIAL (PRIMARY) HYPERTENSION: ICD-10-CM

## 2020-05-04 DIAGNOSIS — R73.9 HYPERGLYCEMIA, UNSPECIFIED: ICD-10-CM

## 2020-05-04 LAB
ALBUMIN SERPL-MCNC: 3.69 G/DL (ref 3.5–5.2)
ALP SERPL-CCNC: 113 U/L (ref 39–117)
ALT SERPL W P-5'-P-CCNC: 13 U/L (ref 1–41)
ANION GAP SERPL CALCULATED.3IONS-SCNC: 13.7 MMOL/L (ref 5–15)
AST SERPL-CCNC: 19 U/L (ref 1–40)
BILIRUB CONJ SERPL-MCNC: <0.2 MG/DL (ref 0.2–0.3)
BILIRUB INDIRECT SERPL-MCNC: ABNORMAL MG/DL
BILIRUB SERPL-MCNC: 0.3 MG/DL (ref 0.2–1.2)
BUN BLD-MCNC: 19 MG/DL (ref 8–23)
BUN/CREAT SERPL: 15.3 (ref 7–25)
CALCIUM SPEC-SCNC: 8.6 MG/DL (ref 8.2–9.6)
CHLORIDE SERPL-SCNC: 102 MMOL/L (ref 98–107)
CHOLEST SERPL-MCNC: 113 MG/DL (ref 0–200)
CO2 SERPL-SCNC: 26.3 MMOL/L (ref 22–29)
CREAT BLD-MCNC: 1.24 MG/DL (ref 0.76–1.27)
GFR SERPL CREATININE-BSD FRML MDRD: 54 ML/MIN/1.73
GLUCOSE BLD-MCNC: 119 MG/DL (ref 65–99)
HDLC SERPL-MCNC: 35 MG/DL (ref 40–60)
LDLC SERPL CALC-MCNC: 42 MG/DL (ref 0–100)
LDLC/HDLC SERPL: 1.2 {RATIO}
POTASSIUM BLD-SCNC: 3.9 MMOL/L (ref 3.5–5.2)
PROT SERPL-MCNC: 6.4 G/DL (ref 6–8.5)
SODIUM BLD-SCNC: 142 MMOL/L (ref 136–145)
TRIGL SERPL-MCNC: 180 MG/DL (ref 0–150)
TSH SERPL DL<=0.05 MIU/L-ACNC: 4.47 UIU/ML (ref 0.27–4.2)
VLDLC SERPL-MCNC: 36 MG/DL

## 2020-05-04 PROCEDURE — 80061 LIPID PANEL: CPT | Performed by: FAMILY MEDICINE

## 2020-05-04 PROCEDURE — 82043 UR ALBUMIN QUANTITATIVE: CPT | Performed by: FAMILY MEDICINE

## 2020-05-04 PROCEDURE — 80048 BASIC METABOLIC PNL TOTAL CA: CPT | Performed by: FAMILY MEDICINE

## 2020-05-04 PROCEDURE — 80076 HEPATIC FUNCTION PANEL: CPT | Performed by: FAMILY MEDICINE

## 2020-05-04 PROCEDURE — 84443 ASSAY THYROID STIM HORMONE: CPT | Performed by: FAMILY MEDICINE

## 2020-05-05 LAB — ALBUMIN UR-MCNC: 1.8 MG/DL

## 2020-05-26 ENCOUNTER — LAB REQUISITION (OUTPATIENT)
Dept: LAB | Facility: HOSPITAL | Age: 85
End: 2020-05-26

## 2020-05-26 DIAGNOSIS — I50.22 CHRONIC SYSTOLIC (CONGESTIVE) HEART FAILURE (HCC): ICD-10-CM

## 2020-05-26 LAB
ANION GAP SERPL CALCULATED.3IONS-SCNC: 12.5 MMOL/L (ref 5–15)
BUN BLD-MCNC: 27 MG/DL (ref 8–23)
BUN/CREAT SERPL: 18.4 (ref 7–25)
CALCIUM SPEC-SCNC: 9.1 MG/DL (ref 8.2–9.6)
CHLORIDE SERPL-SCNC: 104 MMOL/L (ref 98–107)
CO2 SERPL-SCNC: 25.5 MMOL/L (ref 22–29)
CREAT BLD-MCNC: 1.47 MG/DL (ref 0.76–1.27)
GFR SERPL CREATININE-BSD FRML MDRD: 44 ML/MIN/1.73
GLUCOSE BLD-MCNC: 130 MG/DL (ref 65–99)
POTASSIUM BLD-SCNC: 4.3 MMOL/L (ref 3.5–5.2)
SODIUM BLD-SCNC: 142 MMOL/L (ref 136–145)

## 2020-05-26 PROCEDURE — 80048 BASIC METABOLIC PNL TOTAL CA: CPT | Performed by: FAMILY MEDICINE

## 2020-06-29 ENCOUNTER — LAB REQUISITION (OUTPATIENT)
Dept: LAB | Facility: HOSPITAL | Age: 85
End: 2020-06-29

## 2020-06-29 DIAGNOSIS — E13.21 OTHER SPECIFIED DIABETES MELLITUS WITH DIABETIC NEPHROPATHY (HCC): ICD-10-CM

## 2020-06-29 LAB
ANION GAP SERPL CALCULATED.3IONS-SCNC: 11.8 MMOL/L (ref 5–15)
BUN BLD-MCNC: 40 MG/DL (ref 8–23)
BUN/CREAT SERPL: 24.4 (ref 7–25)
CALCIUM SPEC-SCNC: 9.2 MG/DL (ref 8.2–9.6)
CHLORIDE SERPL-SCNC: 104 MMOL/L (ref 98–107)
CO2 SERPL-SCNC: 27.2 MMOL/L (ref 22–29)
CREAT BLD-MCNC: 1.64 MG/DL (ref 0.76–1.27)
GFR SERPL CREATININE-BSD FRML MDRD: 39 ML/MIN/1.73
GLUCOSE BLD-MCNC: 134 MG/DL (ref 65–99)
HBA1C MFR BLD: 6 % (ref 4.8–5.6)
POTASSIUM BLD-SCNC: 4.4 MMOL/L (ref 3.5–5.2)
SODIUM BLD-SCNC: 143 MMOL/L (ref 136–145)

## 2020-06-29 PROCEDURE — 83036 HEMOGLOBIN GLYCOSYLATED A1C: CPT | Performed by: FAMILY MEDICINE

## 2020-06-29 PROCEDURE — 80048 BASIC METABOLIC PNL TOTAL CA: CPT | Performed by: FAMILY MEDICINE

## 2024-06-28 NOTE — OP NOTE
Hal Zaragoza  9/10/2018      Operative Progress Note:    Surgeon and Assistant: Dr. Schulz    Pre-Operative Diagnosis: multiple skin lesions on scalp and left arm    Post-Operative Diagnosis: multiple skin lesions on scalp and left lower    Procedure(s):  elliptical excision of 1 scalp lesion and Bovie cautery of 12 other scalp lesions and Bovie cautery of 4 lesions left arm    Type of Anesthesia Administered: IV general    Estimated Blood Loss: Minimal    Blood Products: None    Specimen Obtained/Removed: elliptical scalp lesion    Complication(s):  None    Graft/Implant/Prosthetics/Implanted Device/Transplants: None    Indication: patient's a 89-year-old white male with multiple carcinoma in situ is an actinic keratosis    Findings: 13 scalp lesions and for left arm lesions all measuring approximately 0.5 cm    Operative Report:  Patient was taken operating room laid in supine position on stretcher.  IV sedation was given.  His scalp was prepped and draped with Betadine.  Marcaine 0.5% with epinephrine was infiltrated 13 separate scalp lesions.  One on scalp lesions was excised elliptically and closed with interrupted 3-0 nylon's.  The 12 other were Bovie cautery.    Patient was then placed in a right lateral decubitus position.  I injected 4 lesions in the posterior scalp with Marcaine 0.5% with epinephrine.  For these were Bovie cautery.    Left arm was prepped and draped.  Marcaine 0.5% with epinephrine is infiltrated.  4 separate lesions were Bovie cautery.  Neosporin was placed on all the wounds and the procedure terminated.  Patient tolerated procedure very well and returned recovery room in satisfactory condition.    Patient will be discharged home at recovery and was seen back in the office in one week       Electronically Signed by: Johnny Schulz MD        Dictated Utilizing Dragon Dictation     Neck , no lymphadenopathy

## (undated) DEVICE — ENCORE® LATEX MICRO SIZE 7.5, STERILE LATEX POWDER-FREE SURGICAL GLOVE: Brand: ENCORE

## (undated) DEVICE — SUT ETHLN 3/0 PS2 18 IN 1669H

## (undated) DEVICE — HOLDER: Brand: DEROYAL

## (undated) DEVICE — BNDG ADHS CURAD FLX/FABRC 2X4IN STRL LF

## (undated) DEVICE — SUT ETHLN 3/0 FS1 663G

## (undated) DEVICE — GOWN,REINF,POLY,ECL,PP SLV,XL: Brand: MEDLINE

## (undated) DEVICE — SUT MNCRYL 4/0 PS2 18 IN

## (undated) DEVICE — SYR LL TP 10ML STRL

## (undated) DEVICE — PK HD AND NK 70

## (undated) DEVICE — ULTRACLEAN ACCESSORY ELECTRODE, 1 INCH COATED NEEDLE WITH EXTENDED INSULATION: Brand: ULTRACLEAN